# Patient Record
Sex: MALE | Race: WHITE | Employment: FULL TIME | URBAN - METROPOLITAN AREA
[De-identification: names, ages, dates, MRNs, and addresses within clinical notes are randomized per-mention and may not be internally consistent; named-entity substitution may affect disease eponyms.]

---

## 2017-05-05 ENCOUNTER — MED REC SCAN ONLY (OUTPATIENT)
Dept: FAMILY MEDICINE CLINIC | Facility: CLINIC | Age: 52
End: 2017-05-05

## 2017-05-05 ENCOUNTER — OFFICE VISIT (OUTPATIENT)
Dept: FAMILY MEDICINE CLINIC | Facility: CLINIC | Age: 52
End: 2017-05-05

## 2017-05-05 VITALS
TEMPERATURE: 98 F | SYSTOLIC BLOOD PRESSURE: 108 MMHG | DIASTOLIC BLOOD PRESSURE: 66 MMHG | BODY MASS INDEX: 28.95 KG/M2 | HEIGHT: 68 IN | WEIGHT: 191 LBS | OXYGEN SATURATION: 98 % | HEART RATE: 90 BPM

## 2017-05-05 DIAGNOSIS — J02.9 PHARYNGITIS, UNSPECIFIED ETIOLOGY: Primary | ICD-10-CM

## 2017-05-05 PROCEDURE — 99213 OFFICE O/P EST LOW 20 MIN: CPT | Performed by: FAMILY MEDICINE

## 2017-05-05 NOTE — PROGRESS NOTES
HPI:  Patient presents with:  Sore Throat: for 2 days      Leah Viera is a 54year old male who presents with upper respiratory symptoms for 2-3 days. Patient reports no cough. Mild post nasal drip. Mild-mod nasal congestion. No fevers or chills. problems, no oral lesions  HEART:  No chest pain or palpitations  LUNG:  no SOB, mild cough, no wheeze  GI:  No abdominal pain.   No N/V/D/C  :  No dysuria  MS:  No joint pain or swelling B  NEURO:  Denies numbness or tingling or weakness  PSYCH:  No mood

## 2017-06-02 ENCOUNTER — PATIENT MESSAGE (OUTPATIENT)
Dept: FAMILY MEDICINE CLINIC | Facility: CLINIC | Age: 52
End: 2017-06-02

## 2017-06-02 DIAGNOSIS — E78.2 MIXED HYPERLIPIDEMIA: Primary | ICD-10-CM

## 2017-06-02 RX ORDER — TAMSULOSIN HYDROCHLORIDE 0.4 MG/1
0.4 CAPSULE ORAL DAILY
Qty: 90 CAPSULE | Refills: 0 | Status: SHIPPED | OUTPATIENT
Start: 2017-06-02 | End: 2017-08-21

## 2017-06-02 RX ORDER — LISINOPRIL 2.5 MG/1
2.5 TABLET ORAL DAILY
Qty: 90 TABLET | Refills: 0 | Status: SHIPPED | OUTPATIENT
Start: 2017-06-02 | End: 2017-10-02

## 2017-06-02 RX ORDER — NIACIN 500 MG/1
500 TABLET, EXTENDED RELEASE ORAL NIGHTLY
Qty: 90 TABLET | Refills: 0 | Status: SHIPPED | OUTPATIENT
Start: 2017-06-02 | End: 2017-07-24

## 2017-06-02 NOTE — TELEPHONE ENCOUNTER
From: Joshua Reyes  To: Michel Bell MD  Sent: 6/2/2017 2:23 PM CDT  Subject: Other    I need Refills for the following:    Zoloft 50  Lisinopril 2.5mg  Flomax . 4 Mg  Niaspan 500 mg    all called into [facility]    I tried to have Jason Waddell

## 2017-06-21 ENCOUNTER — HOSPITAL ENCOUNTER (OUTPATIENT)
Dept: GENERAL RADIOLOGY | Age: 52
Discharge: HOME | End: 2017-06-21
Attending: FAMILY MEDICINE
Payer: COMMERCIAL

## 2017-06-21 ENCOUNTER — OFFICE VISIT (OUTPATIENT)
Dept: FAMILY MEDICINE CLINIC | Facility: CLINIC | Age: 52
End: 2017-06-21

## 2017-06-21 ENCOUNTER — APPOINTMENT (OUTPATIENT)
Dept: LAB | Age: 52
End: 2017-06-21
Attending: FAMILY MEDICINE
Payer: COMMERCIAL

## 2017-06-21 VITALS
SYSTOLIC BLOOD PRESSURE: 116 MMHG | HEIGHT: 68 IN | TEMPERATURE: 98 F | OXYGEN SATURATION: 97 % | BODY MASS INDEX: 29.25 KG/M2 | WEIGHT: 193 LBS | DIASTOLIC BLOOD PRESSURE: 68 MMHG | HEART RATE: 81 BPM

## 2017-06-21 DIAGNOSIS — M25.551 RIGHT HIP PAIN: ICD-10-CM

## 2017-06-21 DIAGNOSIS — E78.2 MIXED HYPERLIPIDEMIA: ICD-10-CM

## 2017-06-21 DIAGNOSIS — I10 ESSENTIAL HYPERTENSION: ICD-10-CM

## 2017-06-21 DIAGNOSIS — N40.0 BENIGN NON-NODULAR PROSTATIC HYPERPLASIA, PRESENCE OF LOWER URINARY TRACT SYMPTOMS UNSPECIFIED: ICD-10-CM

## 2017-06-21 DIAGNOSIS — Z00.00 ROUTINE MEDICAL EXAM: Primary | ICD-10-CM

## 2017-06-21 DIAGNOSIS — Z00.00 ROUTINE MEDICAL EXAM: ICD-10-CM

## 2017-06-21 DIAGNOSIS — R17 ELEVATED BILIRUBIN: ICD-10-CM

## 2017-06-21 DIAGNOSIS — B35.1 ONYCHOMYCOSIS: ICD-10-CM

## 2017-06-21 DIAGNOSIS — F41.9 ANXIETY: ICD-10-CM

## 2017-06-21 DIAGNOSIS — N52.8 OTHER MALE ERECTILE DYSFUNCTION: ICD-10-CM

## 2017-06-21 PROCEDURE — 80061 LIPID PANEL: CPT

## 2017-06-21 PROCEDURE — 36415 COLL VENOUS BLD VENIPUNCTURE: CPT

## 2017-06-21 PROCEDURE — 99396 PREV VISIT EST AGE 40-64: CPT | Performed by: FAMILY MEDICINE

## 2017-06-21 PROCEDURE — 99213 OFFICE O/P EST LOW 20 MIN: CPT | Performed by: FAMILY MEDICINE

## 2017-06-21 PROCEDURE — 80053 COMPREHEN METABOLIC PANEL: CPT

## 2017-06-21 PROCEDURE — 73502 X-RAY EXAM HIP UNI 2-3 VIEWS: CPT | Performed by: FAMILY MEDICINE

## 2017-06-21 PROCEDURE — 82274 ASSAY TEST FOR BLOOD FECAL: CPT | Performed by: FAMILY MEDICINE

## 2017-06-22 PROBLEM — N40.0 BENIGN NON-NODULAR PROSTATIC HYPERPLASIA: Status: ACTIVE | Noted: 2017-06-22

## 2017-06-22 PROBLEM — I10 ESSENTIAL HYPERTENSION: Status: ACTIVE | Noted: 2017-06-22

## 2017-06-22 PROBLEM — F41.9 ANXIETY: Status: ACTIVE | Noted: 2017-06-22

## 2017-06-23 NOTE — H&P
HPI:   Patient presents with:   Annual: yearly px  Hip Pain  Anxiety  HTN  Hyperlipidemia      Mitali Gerardo is a 54year old male who presents for a complete physical exam.     Patient has new complaints of:  · Right hip pain worsening over the last Medical History, Past Surgical History, Family and Social History    Labs:     Lab Results  Component Value Date/Time   WBC 4.4 10/15/2010 09:25 AM    10/15/2010 09:25 AM        Lab Results  Component Value Date/Time   GLU 89 06/21/2017 04:35 PM   N Comment: Procedure: LUMBAR EPIDURAL;  Surgeon:                Mary Hall MD;  Location: [facility]                PAIN [facility]  No date: OTHER SURGICAL HISTORY      Comment: SINUS SURG - 8YR AGO  No date: VASECTOMY   Family History   Problem Re normocephalic, nose and throat are clear; dentition good, EARS: canals clear, TM wnl B  EYES:PERRLA, EOMI, conjunctiva are clear, no discharge; no nystagmus  NECK: supple, no LAD, no TM, no carotid bruits or JVD B  LUNGS: good BS B, clear to auscultation B (Screening) [E]; Future  - Occult Blood, Fecal, Immunoassay; Future  - Occult Blood, Fecal, Immunoassay    2. Essential hypertension, stable  We will continue present management, lisinopril 2.5 mg once a day, follow-up 6 months    3.  Mixed hyperlipidemia

## 2017-06-26 PROBLEM — R17 ELEVATED BILIRUBIN: Status: ACTIVE | Noted: 2017-06-26

## 2017-06-26 PROBLEM — B35.1 ONYCHOMYCOSIS: Status: ACTIVE | Noted: 2017-06-26

## 2017-07-04 ENCOUNTER — PATIENT MESSAGE (OUTPATIENT)
Dept: FAMILY MEDICINE CLINIC | Facility: CLINIC | Age: 52
End: 2017-07-04

## 2017-07-10 NOTE — TELEPHONE ENCOUNTER
From: Cortez Vigil  To: Summer Chu MD  Sent: 7/4/2017 4:01 PM CDT  Subject: Other    BP Results after I stop the BP medicine:    128/73  126/78  123/77     I will continue to track to ensure it stays below 135    Thanks Surekha Miller

## 2017-07-24 DIAGNOSIS — E78.2 MIXED HYPERLIPIDEMIA: ICD-10-CM

## 2017-07-24 NOTE — TELEPHONE ENCOUNTER
From: Leah Viera  Sent: 7/24/2017 8:46 AM CDT  Subject: Medication Renewal Request    Savi Casarez.  Kenneth Crook would like a refill of the following medications:  Niacin ER, Antihyperlipidemic, (NIASPAN) 500 MG Oral Tab CR Wade Valadez MD]    Pr

## 2017-07-25 RX ORDER — NIACIN 500 MG/1
500 TABLET, EXTENDED RELEASE ORAL NIGHTLY
Qty: 90 TABLET | Refills: 0 | Status: SHIPPED
Start: 2017-07-25 | End: 2017-10-13

## 2017-08-21 DIAGNOSIS — E78.2 MIXED HYPERLIPIDEMIA: ICD-10-CM

## 2017-08-22 RX ORDER — TAMSULOSIN HYDROCHLORIDE 0.4 MG/1
CAPSULE ORAL
Qty: 90 CAPSULE | Refills: 0 | Status: SHIPPED | OUTPATIENT
Start: 2017-08-22 | End: 2017-11-10

## 2017-10-02 ENCOUNTER — PATIENT MESSAGE (OUTPATIENT)
Dept: FAMILY MEDICINE CLINIC | Facility: CLINIC | Age: 52
End: 2017-10-02

## 2017-10-02 DIAGNOSIS — E78.2 MIXED HYPERLIPIDEMIA: ICD-10-CM

## 2017-10-03 RX ORDER — LISINOPRIL 2.5 MG/1
TABLET ORAL
Qty: 90 TABLET | Refills: 0 | Status: SHIPPED | OUTPATIENT
Start: 2017-10-03 | End: 2017-10-13 | Stop reason: ALTCHOICE

## 2017-10-13 ENCOUNTER — OFFICE VISIT (OUTPATIENT)
Dept: FAMILY MEDICINE CLINIC | Facility: CLINIC | Age: 52
End: 2017-10-13

## 2017-10-13 VITALS
SYSTOLIC BLOOD PRESSURE: 120 MMHG | HEART RATE: 79 BPM | DIASTOLIC BLOOD PRESSURE: 84 MMHG | OXYGEN SATURATION: 97 % | BODY MASS INDEX: 29.1 KG/M2 | HEIGHT: 68 IN | WEIGHT: 192 LBS | TEMPERATURE: 99 F

## 2017-10-13 DIAGNOSIS — Z23 NEED FOR VACCINATION: ICD-10-CM

## 2017-10-13 DIAGNOSIS — I10 ESSENTIAL HYPERTENSION: ICD-10-CM

## 2017-10-13 DIAGNOSIS — E78.2 MIXED HYPERLIPIDEMIA: Primary | ICD-10-CM

## 2017-10-13 DIAGNOSIS — F32.A DEPRESSION, UNSPECIFIED DEPRESSION TYPE: ICD-10-CM

## 2017-10-13 DIAGNOSIS — F41.9 ANXIETY: ICD-10-CM

## 2017-10-13 PROCEDURE — 99214 OFFICE O/P EST MOD 30 MIN: CPT | Performed by: FAMILY MEDICINE

## 2017-10-13 PROCEDURE — 90715 TDAP VACCINE 7 YRS/> IM: CPT | Performed by: FAMILY MEDICINE

## 2017-10-13 PROCEDURE — 90472 IMMUNIZATION ADMIN EACH ADD: CPT | Performed by: FAMILY MEDICINE

## 2017-10-13 PROCEDURE — 90686 IIV4 VACC NO PRSV 0.5 ML IM: CPT | Performed by: FAMILY MEDICINE

## 2017-10-13 PROCEDURE — 90471 IMMUNIZATION ADMIN: CPT | Performed by: FAMILY MEDICINE

## 2017-10-13 RX ORDER — ATORVASTATIN CALCIUM 10 MG/1
10 TABLET, FILM COATED ORAL
Qty: 90 TABLET | Refills: 0 | Status: SHIPPED | OUTPATIENT
Start: 2017-10-13 | End: 2017-12-27

## 2017-10-13 RX ORDER — NIACIN 500 MG/1
TABLET, EXTENDED RELEASE ORAL
Qty: 180 TABLET | Refills: 0 | Status: SHIPPED | OUTPATIENT
Start: 2017-10-13 | End: 2017-12-27

## 2017-10-13 NOTE — PROGRESS NOTES
HPI:   Patient presents with:  Hyperlipidemia  Depression  Hypertension: not on Rx now-resolved      Valiant Silvano is a 50year old male who presents for recheck of his hyperlipidemia.     Current medication: Lipitor 10 mg once a day, Niaspan 06/21/2017 04:35 PM   AST 21 06/21/2017 04:35 PM   ALT 27 06/21/2017 04:35 PM   BILT 2.0 (H) 06/21/2017 04:35 PM   TSH 1.736 06/29/2007 01:37 PM           Medications:    Current Outpatient Prescriptions on File Prior to Visit:  SERTRALINE HCL 50 MG Oral T lesions  HEART:  No chest pain or palpitations  LUNG:  No SOB, cough or wheeze  GI:  No abdominal pain.   No N/V/D/C  :  No dysuria  MS:  No joint pain or swelling B  SKIN: No skin lesions  NEURO:  No numbness, tingling or weakness  PSYCH:  No mood concer return in 8 month(s). Additional Assessment and Plan:  1. Mixed hyperlipidemia  See above  - Niacin ER, Antihyperlipidemic, (NIASPAN) 500 MG Oral Tab CR; 2 tabs po qam  Dispense: 180 tablet; Refill: 0  - atorvastatin (LIPITOR) 10 MG Oral Tab;  Take 1 tab

## 2017-11-10 DIAGNOSIS — E78.2 MIXED HYPERLIPIDEMIA: ICD-10-CM

## 2017-11-10 RX ORDER — TAMSULOSIN HYDROCHLORIDE 0.4 MG/1
CAPSULE ORAL
Qty: 90 CAPSULE | Refills: 0 | Status: SHIPPED | OUTPATIENT
Start: 2017-11-10 | End: 2017-12-27

## 2017-11-10 NOTE — TELEPHONE ENCOUNTER
[address] [facility] [employer] requests refill for:    Tamsulosin HCL 0.4 mg oral capsule  Sig: Take 1 capsule PO QD  Qty: 90    LOV 10/13/17 unrelated issue  LOV 6/21/17 BPH  F/p due 6mo-1yr  LRF 8/22/17

## 2017-11-21 ENCOUNTER — HOSPITAL ENCOUNTER (OUTPATIENT)
Dept: CT IMAGING | Age: 52
Discharge: HOME | End: 2017-11-21
Attending: FAMILY MEDICINE

## 2017-11-21 DIAGNOSIS — Z13.9 ENCOUNTER FOR SCREENING: ICD-10-CM

## 2017-12-27 DIAGNOSIS — E78.2 MIXED HYPERLIPIDEMIA: ICD-10-CM

## 2017-12-27 RX ORDER — NIACIN 500 MG/1
TABLET, EXTENDED RELEASE ORAL
Qty: 180 TABLET | Refills: 0 | Status: SHIPPED | OUTPATIENT
Start: 2017-12-27 | End: 2018-04-29

## 2017-12-27 RX ORDER — ATORVASTATIN CALCIUM 10 MG/1
TABLET, FILM COATED ORAL
Qty: 90 TABLET | Refills: 0 | Status: SHIPPED | OUTPATIENT
Start: 2017-12-27 | End: 2018-07-12

## 2017-12-28 RX ORDER — TAMSULOSIN HYDROCHLORIDE 0.4 MG/1
CAPSULE ORAL
Qty: 90 CAPSULE | Refills: 0 | Status: SHIPPED | OUTPATIENT
Start: 2017-12-28 | End: 2018-03-23

## 2017-12-28 NOTE — TELEPHONE ENCOUNTER
[address] requests refill for:    Tamsulosin HCL 0.4 mg oral capsule  Sig: Take 1 capsule QD  Qty: 90    LOV 10/13/17 Non-related issue; 6/21/17 BPH  F/u due 6 mo to 1 year p LOV  Last PSA 6/21/17  Normal 0.6 ng/ml

## 2017-12-31 DIAGNOSIS — E78.2 MIXED HYPERLIPIDEMIA: ICD-10-CM

## 2018-01-03 RX ORDER — LISINOPRIL 2.5 MG/1
TABLET ORAL
Qty: 90 TABLET | Refills: 0 | Status: SHIPPED | OUTPATIENT
Start: 2018-01-03 | End: 2018-06-29 | Stop reason: ALTCHOICE

## 2018-03-23 DIAGNOSIS — E78.2 MIXED HYPERLIPIDEMIA: ICD-10-CM

## 2018-03-23 RX ORDER — TAMSULOSIN HYDROCHLORIDE 0.4 MG/1
CAPSULE ORAL
Qty: 90 CAPSULE | Refills: 0 | Status: SHIPPED | OUTPATIENT
Start: 2018-03-23 | End: 2018-04-30

## 2018-04-29 DIAGNOSIS — E78.2 MIXED HYPERLIPIDEMIA: ICD-10-CM

## 2018-04-30 RX ORDER — TAMSULOSIN HYDROCHLORIDE 0.4 MG/1
CAPSULE ORAL
Qty: 90 CAPSULE | Refills: 0 | Status: SHIPPED | OUTPATIENT
Start: 2018-04-30 | End: 2018-07-26

## 2018-04-30 RX ORDER — NIACIN 500 MG/1
TABLET, EXTENDED RELEASE ORAL
Qty: 180 TABLET | Refills: 0 | Status: SHIPPED | OUTPATIENT
Start: 2018-04-30 | End: 2018-07-26

## 2018-06-10 DIAGNOSIS — E78.2 MIXED HYPERLIPIDEMIA: ICD-10-CM

## 2018-06-11 NOTE — TELEPHONE ENCOUNTER
A refill request was received for:    Pending Prescriptions Disp Refills    SERTRALINE HCL 50 MG Oral Tab [Pharmacy Med Name: SERTRALINE TAB 50MG] 90 tablet 0      Sig: TAKE 1 TABLET DAILY         Last refill date: 3/23/18  Qty:90  Last office visit: 10/13

## 2018-06-28 ENCOUNTER — TELEPHONE (OUTPATIENT)
Dept: FAMILY MEDICINE CLINIC | Facility: CLINIC | Age: 53
End: 2018-06-28

## 2018-06-28 DIAGNOSIS — N40.0 BENIGN NON-NODULAR PROSTATIC HYPERPLASIA: ICD-10-CM

## 2018-06-28 DIAGNOSIS — E78.2 MIXED HYPERLIPIDEMIA: Primary | ICD-10-CM

## 2018-06-29 ENCOUNTER — OFFICE VISIT (OUTPATIENT)
Dept: FAMILY MEDICINE CLINIC | Facility: CLINIC | Age: 53
End: 2018-06-29

## 2018-06-29 VITALS
SYSTOLIC BLOOD PRESSURE: 124 MMHG | WEIGHT: 190 LBS | OXYGEN SATURATION: 99 % | RESPIRATION RATE: 17 BRPM | HEART RATE: 87 BPM | BODY MASS INDEX: 28.79 KG/M2 | DIASTOLIC BLOOD PRESSURE: 82 MMHG | HEIGHT: 68 IN

## 2018-06-29 DIAGNOSIS — Z86.79 HISTORY OF HYPERTENSION: ICD-10-CM

## 2018-06-29 DIAGNOSIS — Z12.11 SCREENING FOR COLON CANCER: ICD-10-CM

## 2018-06-29 DIAGNOSIS — B35.1 ONYCHOMYCOSIS: ICD-10-CM

## 2018-06-29 DIAGNOSIS — F41.1 GENERALIZED ANXIETY DISORDER: ICD-10-CM

## 2018-06-29 DIAGNOSIS — N40.0 BENIGN NON-NODULAR PROSTATIC HYPERPLASIA: ICD-10-CM

## 2018-06-29 DIAGNOSIS — E78.2 MIXED HYPERLIPIDEMIA: Primary | ICD-10-CM

## 2018-06-29 PROCEDURE — 99214 OFFICE O/P EST MOD 30 MIN: CPT | Performed by: FAMILY MEDICINE

## 2018-06-30 NOTE — PROGRESS NOTES
HPI:   Patient presents with:  Hyperlipidemia  Anxiety  Depression  Prostate Problem      PatientMicpetrosrobyn Michelle is a 50year old male who presents for recheck of his hyperlipidemia.     Current medication:  See below: Atorvastatin 10 mg once a day, ni PM   BILT 2.0 (H) 06/21/2017 04:35 PM   TSH 1.736 06/29/2007 01:37 PM           Medications:    Current Outpatient Prescriptions on File Prior to Visit:  SERTRALINE HCL 50 MG Oral Tab TAKE 1 TABLET DAILY Disp: 90 tablet Rfl: 0   NIACIN ER, ANTIHYPERLIPIDEM problems, no oral lesions  HEART:  No chest pain or palpitations  LUNG:  No SOB, cough or wheeze  GI:  No abdominal pain.   No N/V/D/C  :  No dysuria  MS: Chronic right hip pain, intermittent  SKIN: No skin lesions  NEURO:  No numbness, tingling or weakne anxiety disorder  Appears stable, mood good, no SHIP, will CPM, f/u 6 mo     3. Benign non-nodular prostatic hyperplasia  Stable, no new complaints, continue present management, follow-up 6-12 months    4.  History of hypertension  Now resolved off medicati

## 2018-07-10 LAB
ABSOLUTE BASOPHILS: 30 CELLS/UL (ref 0–200)
ABSOLUTE EOSINOPHILS: 60 CELLS/UL (ref 15–500)
ABSOLUTE LYMPHOCYTES: 2012 CELLS/UL (ref 850–3900)
ABSOLUTE MONOCYTES: 391 CELLS/UL (ref 200–950)
ABSOLUTE NEUTROPHILS: 1806 CELLS/UL (ref 1500–7800)
ALBUMIN/GLOBULIN RATIO: 1.9 (CALC) (ref 1–2.5)
ALBUMIN: 4.4 G/DL (ref 3.6–5.1)
ALKALINE PHOSPHATASE: 84 U/L (ref 40–115)
ALT: 20 U/L (ref 9–46)
AST: 19 U/L (ref 10–35)
BASOPHILS: 0.7 %
BILIRUBIN, TOTAL: 2 MG/DL (ref 0.2–1.2)
BUN: 15 MG/DL (ref 7–25)
CALCIUM: 9.6 MG/DL (ref 8.6–10.3)
CARBON DIOXIDE: 26 MMOL/L (ref 20–31)
CHLORIDE: 105 MMOL/L (ref 98–110)
CHOL/HDLC RATIO: 4.3 (CALC)
CHOLESTEROL, TOTAL: 184 MG/DL
CREATININE: 1.07 MG/DL (ref 0.7–1.33)
EGFR IF AFRICN AM: 93 ML/MIN/1.73M2
EGFR IF NONAFRICN AM: 81 ML/MIN/1.73M2
EOSINOPHILS: 1.4 %
GLOBULIN: 2.3 G/DL (CALC) (ref 1.9–3.7)
GLUCOSE: 99 MG/DL (ref 65–99)
HDL CHOLESTEROL: 43 MG/DL
HEMATOCRIT: 45.9 % (ref 38.5–50)
HEMOGLOBIN: 15.2 G/DL (ref 13.2–17.1)
LDL-CHOLESTEROL: 104 MG/DL (CALC)
LYMPHOCYTES: 46.8 %
MCH: 28.8 PG (ref 27–33)
MCHC: 33.1 G/DL (ref 32–36)
MCV: 87.1 FL (ref 80–100)
MONOCYTES: 9.1 %
MPV: 10.2 FL (ref 7.5–12.5)
NEUTROPHILS: 42 %
NON-HDL CHOLESTEROL: 141 MG/DL (CALC)
PLATELET COUNT: 236 THOUSAND/UL (ref 140–400)
POTASSIUM: 4.1 MMOL/L (ref 3.5–5.3)
PROTEIN, TOTAL: 6.7 G/DL (ref 6.1–8.1)
PSA, TOTAL: 0.4 NG/ML
RDW: 12.5 % (ref 11–15)
RED BLOOD CELL COUNT: 5.27 MILLION/UL (ref 4.2–5.8)
SODIUM: 140 MMOL/L (ref 135–146)
TRIGLYCERIDES: 260 MG/DL
WHITE BLOOD CELL COUNT: 4.3 THOUSAND/UL (ref 3.8–10.8)

## 2018-07-26 DIAGNOSIS — E78.2 MIXED HYPERLIPIDEMIA: ICD-10-CM

## 2018-07-26 RX ORDER — NIACIN 500 MG/1
TABLET, EXTENDED RELEASE ORAL
Qty: 180 TABLET | Refills: 0 | Status: SHIPPED | OUTPATIENT
Start: 2018-07-26 | End: 2018-10-13

## 2018-07-26 RX ORDER — TAMSULOSIN HYDROCHLORIDE 0.4 MG/1
CAPSULE ORAL
Qty: 90 CAPSULE | Refills: 0 | Status: SHIPPED | OUTPATIENT
Start: 2018-07-26 | End: 2019-02-22

## 2018-07-26 NOTE — TELEPHONE ENCOUNTER
Rx Niacin passes protocol - rx refilled.   CH; ALT < 80, ALT in past y, lipid in past y, apt 6/29/18

## 2018-07-26 NOTE — TELEPHONE ENCOUNTER
A refill request was received for:    Pending Prescriptions Disp Refills    SERTRALINE HCL 50 MG Oral Tab [Pharmacy Med Name: SERTRALINE TAB 50MG] 90 tablet 0      Sig: TAKE 1 TABLET DAILY      TAMSULOSIN HCL 0.4 MG Oral Cap [Pharmacy Med Name: TAMSULOSIN

## 2018-09-28 ENCOUNTER — MED REC SCAN ONLY (OUTPATIENT)
Dept: FAMILY MEDICINE CLINIC | Facility: CLINIC | Age: 53
End: 2018-09-28

## 2018-10-13 DIAGNOSIS — E78.2 MIXED HYPERLIPIDEMIA: ICD-10-CM

## 2018-10-14 RX ORDER — NIACIN 500 MG/1
TABLET, EXTENDED RELEASE ORAL
Qty: 180 TABLET | Refills: 0 | Status: SHIPPED | OUTPATIENT
Start: 2018-10-14 | End: 2018-11-23

## 2018-11-23 DIAGNOSIS — E78.2 MIXED HYPERLIPIDEMIA: ICD-10-CM

## 2018-11-25 RX ORDER — NIACIN 500 MG/1
TABLET, EXTENDED RELEASE ORAL
Qty: 180 TABLET | Refills: 0 | Status: SHIPPED | OUTPATIENT
Start: 2018-11-25 | End: 2019-02-22

## 2018-11-26 NOTE — TELEPHONE ENCOUNTER
A refill request was received for:  Requested Prescriptions     Pending Prescriptions Disp Refills   • SERTRALINE HCL 50 MG Oral Tab [Pharmacy Med Name: SERTRALINE TAB 50MG] 90 tablet 0     Sig: TAKE 1 TABLET DAILY   • TAMSULOSIN HCL 0.4 MG Oral Cap [Pharm

## 2018-11-27 RX ORDER — TAMSULOSIN HYDROCHLORIDE 0.4 MG/1
CAPSULE ORAL
Qty: 90 CAPSULE | Refills: 0 | Status: SHIPPED | OUTPATIENT
Start: 2018-11-27 | End: 2019-02-22

## 2018-12-17 PROCEDURE — 88305 TISSUE EXAM BY PATHOLOGIST: CPT | Performed by: INTERNAL MEDICINE

## 2019-02-22 ENCOUNTER — OFFICE VISIT (OUTPATIENT)
Dept: FAMILY MEDICINE CLINIC | Facility: CLINIC | Age: 54
End: 2019-02-22
Payer: COMMERCIAL

## 2019-02-22 VITALS
DIASTOLIC BLOOD PRESSURE: 78 MMHG | OXYGEN SATURATION: 98 % | SYSTOLIC BLOOD PRESSURE: 124 MMHG | TEMPERATURE: 98 F | HEART RATE: 72 BPM | WEIGHT: 185 LBS | RESPIRATION RATE: 18 BRPM | HEIGHT: 67 IN | BODY MASS INDEX: 29.03 KG/M2

## 2019-02-22 DIAGNOSIS — R17 ELEVATED BILIRUBIN: ICD-10-CM

## 2019-02-22 DIAGNOSIS — Z86.79 HISTORY OF HYPERTENSION: ICD-10-CM

## 2019-02-22 DIAGNOSIS — Z00.00 ROUTINE MEDICAL EXAM: Primary | ICD-10-CM

## 2019-02-22 DIAGNOSIS — F41.1 GENERALIZED ANXIETY DISORDER: ICD-10-CM

## 2019-02-22 DIAGNOSIS — E78.2 MIXED HYPERLIPIDEMIA: ICD-10-CM

## 2019-02-22 DIAGNOSIS — N40.0 BENIGN NON-NODULAR PROSTATIC HYPERPLASIA: ICD-10-CM

## 2019-02-22 PROCEDURE — 99213 OFFICE O/P EST LOW 20 MIN: CPT | Performed by: FAMILY MEDICINE

## 2019-02-22 PROCEDURE — 99396 PREV VISIT EST AGE 40-64: CPT | Performed by: FAMILY MEDICINE

## 2019-02-22 RX ORDER — TAMSULOSIN HYDROCHLORIDE 0.4 MG/1
0.4 CAPSULE ORAL
Qty: 90 CAPSULE | Refills: 0 | Status: SHIPPED | OUTPATIENT
Start: 2019-02-22 | End: 2019-06-05

## 2019-02-22 RX ORDER — NIACIN 500 MG/1
TABLET, EXTENDED RELEASE ORAL
Qty: 180 TABLET | Refills: 0 | Status: SHIPPED | OUTPATIENT
Start: 2019-02-22

## 2019-02-22 RX ORDER — ATORVASTATIN CALCIUM 10 MG/1
TABLET, FILM COATED ORAL
Qty: 90 TABLET | Refills: 0 | Status: SHIPPED | OUTPATIENT
Start: 2019-02-22 | End: 2019-10-26

## 2019-02-24 NOTE — H&P
HPI:   Patient presents with:  Physical  Hyperlipidemia  Anxiety      Tracy Palacio is a 48year old male who presents for a complete physical exam.     Patient has new complaints of:  Hyperlipidemia recheck.   Current medication:  See below:  Lele (H) 07/09/2018 07:34 AM    NONHDLC 141 (H) 07/09/2018 07:34 AM       No results found for: A1C   No results found for: VITD          Current Outpatient Medications:  Niacin ER, Antihyperlipidemic, 500 MG Oral Tab CR TAKE 2 TABLETS EVERY       MORNING Disp: Alcohol use: Yes        Frequency: 2-4 times a month        Drinks per session: 1 or 2        Binge frequency: Never        Comment: social      Drug use: No    Other Topics      Concerns:        REVIEW OF SYSTEMS:   Pertinent positives and negatives noted is a 48year old male who presents for a complete physical exam.    Patient is in good health. His weight is stable, Estimated body mass index is 28.98 kg/m² as calculated from the following:    Height as of this encounter: 67\".     Weight as of this enco encounter.       Meds & Refills for this Visit:  Requested Prescriptions     Signed Prescriptions Disp Refills   • Niacin ER, Antihyperlipidemic, 500 MG Oral Tab  tablet 0     Sig: TAKE 2 TABLETS EVERY       MORNING   • Sertraline HCl 50 MG Oral Tab 9

## 2019-06-05 DIAGNOSIS — E78.2 MIXED HYPERLIPIDEMIA: ICD-10-CM

## 2019-06-05 RX ORDER — TAMSULOSIN HYDROCHLORIDE 0.4 MG/1
CAPSULE ORAL
Qty: 90 CAPSULE | Refills: 0 | Status: SHIPPED | OUTPATIENT
Start: 2019-06-05 | End: 2019-08-23

## 2019-07-13 DIAGNOSIS — E78.2 MIXED HYPERLIPIDEMIA: ICD-10-CM

## 2019-07-13 RX ORDER — NIACIN 500 MG/1
TABLET, EXTENDED RELEASE ORAL
Qty: 180 TABLET | Refills: 0 | Status: SHIPPED | OUTPATIENT
Start: 2019-07-13 | End: 2019-10-08

## 2019-07-13 NOTE — TELEPHONE ENCOUNTER
Rx passes protocol - rx refilled. Lipid; ALT < 80 ALT and lipid done in past 12 m, apt in past 12 m     Return in about 6 months (around 8/22/2019) for HYPERLIPIDEMIA, ANXIETY.

## 2019-07-13 NOTE — TELEPHONE ENCOUNTER
Please call pt - due for follow up cholesterol next month as Dr. Genesis Kwong is booking out until then

## 2019-08-23 ENCOUNTER — TELEPHONE (OUTPATIENT)
Dept: FAMILY MEDICINE CLINIC | Facility: CLINIC | Age: 54
End: 2019-08-23

## 2019-08-23 DIAGNOSIS — E78.2 MIXED HYPERLIPIDEMIA: ICD-10-CM

## 2019-08-23 RX ORDER — TAMSULOSIN HYDROCHLORIDE 0.4 MG/1
0.4 CAPSULE ORAL
Qty: 90 CAPSULE | Refills: 0 | Status: SHIPPED | OUTPATIENT
Start: 2019-08-23 | End: 2019-11-11

## 2019-08-23 NOTE — TELEPHONE ENCOUNTER
A refill request was received for:  Requested Prescriptions     Pending Prescriptions Disp Refills   • tamsulosin HCl 0.4 MG Oral Cap 90 capsule 0     Sig: Take 1 capsule (0.4 mg total) by mouth once daily.      Last refill date: 06/05/2019  Qty:90  Last of

## 2019-08-23 NOTE — TELEPHONE ENCOUNTER
Can you please make an appointment for this patient in order to get his medication.   Thank you   Brittanie Maurice

## 2019-08-23 NOTE — TELEPHONE ENCOUNTER
A refill request was received for:  Requested Prescriptions     Pending Prescriptions Disp Refills   • tamsulosin HCl 0.4 MG Oral Cap 90 capsule 0     Sig: Take 1 capsule (0.4 mg total) by mouth once daily.    • Sertraline HCl 50 MG Oral Tab 90 tablet 0

## 2019-10-08 DIAGNOSIS — E78.2 MIXED HYPERLIPIDEMIA: ICD-10-CM

## 2019-10-08 NOTE — TELEPHONE ENCOUNTER
Please call patient, has apt scheduled for 12/27/2019, needs sooner apt. Will send in refill once done. For cpe LOV 2/22/19 - Return in about 6 months (around 8/22/2019) for [address] [place] [address], [facility].

## 2019-10-09 NOTE — TELEPHONE ENCOUNTER
Cld PT to see if he can come in earlies than Dec. He cannot because he is living in [place] so 12/27 is the soonest he can get in.

## 2019-10-18 RX ORDER — NIACIN 500 MG/1
TABLET, EXTENDED RELEASE ORAL
Qty: 120 TABLET | Refills: 0 | Status: SHIPPED | OUTPATIENT
Start: 2019-10-18 | End: 2019-12-06

## 2019-10-18 NOTE — TELEPHONE ENCOUNTER
Rx passes protocol - rx refilled.   Niacin / hyperlipid; ALT < 80, ALT and lipid in past 12 m, apt in past 12m

## 2019-10-26 DIAGNOSIS — E78.2 MIXED HYPERLIPIDEMIA: ICD-10-CM

## 2019-10-26 RX ORDER — ATORVASTATIN CALCIUM 10 MG/1
TABLET, FILM COATED ORAL
Qty: 90 TABLET | Refills: 0 | Status: SHIPPED | OUTPATIENT
Start: 2019-10-26

## 2019-10-26 NOTE — TELEPHONE ENCOUNTER
A refill request was received for:  Requested Prescriptions     Pending Prescriptions Disp Refills   • atorvastatin 10 MG Oral Tab 90 tablet 0     Sig: TAKE 1 TABLET BY MOUTH ONE TIME A DAY     Last refill date:  2/22/2019  Qty:   90  Last office visit:  2

## 2019-11-11 DIAGNOSIS — E78.2 MIXED HYPERLIPIDEMIA: ICD-10-CM

## 2019-11-11 RX ORDER — TAMSULOSIN HYDROCHLORIDE 0.4 MG/1
CAPSULE ORAL
Qty: 90 CAPSULE | Refills: 0 | Status: SHIPPED | OUTPATIENT
Start: 2019-11-11

## 2019-12-06 DIAGNOSIS — E78.2 MIXED HYPERLIPIDEMIA: ICD-10-CM

## 2019-12-06 RX ORDER — NIACIN 500 MG/1
500 TABLET, EXTENDED RELEASE ORAL 2 TIMES DAILY
Qty: 60 TABLET | Refills: 0 | Status: SHIPPED | OUTPATIENT
Start: 2019-12-06 | End: 2019-12-26

## 2019-12-06 NOTE — TELEPHONE ENCOUNTER
Rx passes protocol-rx refilled. Lipid; ALT < 80, ALT and lipid in past 12 m, apt in past 12 m    30 d sent.

## 2019-12-22 PROBLEM — F41.1 GENERALIZED ANXIETY DISORDER: Status: ACTIVE | Noted: 2019-12-22

## 2019-12-22 PROBLEM — I10 ESSENTIAL HYPERTENSION: Status: RESOLVED | Noted: 2017-06-22 | Resolved: 2019-12-22

## 2019-12-25 DIAGNOSIS — E78.2 MIXED HYPERLIPIDEMIA: ICD-10-CM

## 2019-12-26 RX ORDER — NIACIN 500 MG/1
TABLET, EXTENDED RELEASE ORAL
Qty: 60 TABLET | Refills: 0 | Status: SHIPPED | OUTPATIENT
Start: 2019-12-26

## 2019-12-26 NOTE — TELEPHONE ENCOUNTER
Rx passes protocol - rx refilled.  30 day sent - due for f/u  Lipid; ALT < 80, ALT and lipid in past 12 m, apt in past 12m

## 2020-01-24 DIAGNOSIS — E78.2 MIXED HYPERLIPIDEMIA: ICD-10-CM

## 2020-01-27 RX ORDER — NIACIN 500 MG/1
TABLET, EXTENDED RELEASE ORAL
Qty: 60 TABLET | Refills: 0 | OUTPATIENT
Start: 2020-01-27

## 2020-01-27 NOTE — TELEPHONE ENCOUNTER
Spoke to pt, he currently lives in [place]. Pt states he will not continue care unless he moves back to [place].

## 2020-01-29 DIAGNOSIS — E78.2 MIXED HYPERLIPIDEMIA: ICD-10-CM

## 2020-01-30 RX ORDER — ATORVASTATIN CALCIUM 10 MG/1
TABLET, FILM COATED ORAL
Qty: 90 TABLET | Refills: 0 | OUTPATIENT
Start: 2020-01-30

## 2020-01-30 NOTE — TELEPHONE ENCOUNTER
Pt has moved to [facility], will not cont care with Dr. Bernice Anthony unless moves back to [place].

## 2024-10-07 ENCOUNTER — IMAGING SERVICES (OUTPATIENT)
Dept: GENERAL RADIOLOGY | Age: 59
End: 2024-10-07
Attending: INTERNAL MEDICINE

## 2024-10-07 ENCOUNTER — TELEPHONE (OUTPATIENT)
Dept: SPORTS MEDICINE | Age: 59
End: 2024-10-07

## 2024-10-07 ENCOUNTER — OFFICE VISIT (OUTPATIENT)
Dept: SPORTS MEDICINE | Age: 59
End: 2024-10-07

## 2024-10-07 VITALS
BODY MASS INDEX: 31.86 KG/M2 | WEIGHT: 203 LBS | HEIGHT: 67 IN | SYSTOLIC BLOOD PRESSURE: 126 MMHG | HEART RATE: 80 BPM | DIASTOLIC BLOOD PRESSURE: 60 MMHG

## 2024-10-07 DIAGNOSIS — M25.532 LEFT WRIST PAIN: ICD-10-CM

## 2024-10-07 DIAGNOSIS — E66.811 CLASS 1 OBESITY WITH BODY MASS INDEX (BMI) OF 31.0 TO 31.9 IN ADULT, UNSPECIFIED OBESITY TYPE, UNSPECIFIED WHETHER SERIOUS COMORBIDITY PRESENT: ICD-10-CM

## 2024-10-07 DIAGNOSIS — S69.92XA LEFT WRIST INJURY, INITIAL ENCOUNTER: ICD-10-CM

## 2024-10-07 DIAGNOSIS — M25.832 ULNAR IMPACTION SYNDROME, LEFT: ICD-10-CM

## 2024-10-07 DIAGNOSIS — S69.82XA INJURY OF TRIANGULAR FIBROCARTILAGE COMPLEX (TFCC) OF LEFT WRIST, INITIAL ENCOUNTER: Primary | ICD-10-CM

## 2024-10-07 PROCEDURE — 73110 X-RAY EXAM OF WRIST: CPT | Performed by: RADIOLOGY

## 2024-10-07 PROCEDURE — 99204 OFFICE O/P NEW MOD 45 MIN: CPT | Performed by: INTERNAL MEDICINE

## 2024-10-07 RX ORDER — ROSUVASTATIN CALCIUM 20 MG/1
TABLET, COATED ORAL
COMMUNITY
Start: 2024-08-20

## 2024-10-07 RX ORDER — ATORVASTATIN CALCIUM 10 MG/1
10 TABLET, FILM COATED ORAL DAILY
COMMUNITY

## 2024-10-07 RX ORDER — ALBUTEROL SULFATE 90 UG/1
INHALANT RESPIRATORY (INHALATION)
COMMUNITY
Start: 2024-06-28

## 2024-10-07 RX ORDER — TAMSULOSIN HYDROCHLORIDE 0.4 MG/1
CAPSULE ORAL
COMMUNITY
Start: 2024-09-21

## 2024-10-07 ASSESSMENT — ENCOUNTER SYMPTOMS
SORE THROAT: 0
NERVOUS/ANXIOUS: 0
SHORTNESS OF BREATH: 0
CHILLS: 0
SLEEP DISTURBANCE: 0
BACK PAIN: 0
EYE PAIN: 0
FEVER: 0
WOUND: 0
DIARRHEA: 0
CONSTIPATION: 0
WHEEZING: 0
ABDOMINAL PAIN: 0
SINUS PAIN: 0
SEIZURES: 0
COUGH: 0

## 2024-10-09 PROBLEM — M25.532 LEFT WRIST PAIN: Status: ACTIVE | Noted: 2024-10-09

## 2024-10-09 ASSESSMENT — ENCOUNTER SYMPTOMS
PAIN LOCATION: LEFT ULNAR WRIST
ALLEVIATING FACTORS: REST
ALLEVIATING FACTORS: AVOIDING MOVEMENT IN INVOLVED AREA
PAIN FREQUENCY: INTERMITTENT
ALLEVIATING FACTORS: ICE

## 2024-10-10 ENCOUNTER — OFFICE VISIT (OUTPATIENT)
Dept: PHYSICAL THERAPY | Age: 59
End: 2024-10-10

## 2024-10-10 DIAGNOSIS — M25.532 LEFT WRIST PAIN: Primary | ICD-10-CM

## 2024-10-10 ASSESSMENT — ENCOUNTER SYMPTOMS
PAIN SCALE AT HIGHEST: 8
QUALITY: TIGHT
SUBJECTIVE PAIN PROGRESSION: IMPROVED
PAIN SEVERITY NOW: 3
QUALITY: SHARP

## 2024-10-11 ENCOUNTER — OFFICE VISIT (OUTPATIENT)
Dept: PHYSICAL THERAPY | Age: 59
End: 2024-10-11

## 2024-10-11 DIAGNOSIS — M25.532 LEFT WRIST PAIN: Primary | ICD-10-CM

## 2024-10-11 ASSESSMENT — ENCOUNTER SYMPTOMS
ALLEVIATING FACTORS: REST
QUALITY: STIFF
QUALITY: SHARP
PAIN SEVERITY NOW: 3
PAIN LOCATION: LEFT ULNAR WRIST

## 2024-10-22 ENCOUNTER — APPOINTMENT (OUTPATIENT)
Dept: PHYSICAL THERAPY | Age: 59
End: 2024-10-22

## 2024-10-22 DIAGNOSIS — M25.532 LEFT WRIST PAIN: Primary | ICD-10-CM

## 2024-10-22 ASSESSMENT — ENCOUNTER SYMPTOMS
QUALITY: TIGHT
PAIN LOCATION: LEFT ULNAR WRIST
ALLEVIATING FACTORS: ICE
PAIN SEVERITY NOW: 2
QUALITY: ACHE
ALLEVIATING FACTORS: REST

## 2024-10-24 ENCOUNTER — LAB ENCOUNTER (OUTPATIENT)
Dept: LAB | Age: 59
End: 2024-10-24
Attending: FAMILY MEDICINE
Payer: COMMERCIAL

## 2024-10-24 DIAGNOSIS — M10.9 GOUT, UNSPECIFIED: ICD-10-CM

## 2024-10-24 DIAGNOSIS — E78.5 HYPERLIPIDEMIA: ICD-10-CM

## 2024-10-24 DIAGNOSIS — R10.9 STOMACH ACHE: Primary | ICD-10-CM

## 2024-10-24 DIAGNOSIS — Z00.00 ROUTINE GENERAL MEDICAL EXAMINATION AT A HEALTH CARE FACILITY: ICD-10-CM

## 2024-10-24 LAB
ALBUMIN SERPL-MCNC: 4.9 G/DL (ref 3.2–4.8)
ALBUMIN/GLOB SERPL: 2.2 {RATIO} (ref 1–2)
ALP LIVER SERPL-CCNC: 100 U/L
ALT SERPL-CCNC: 30 U/L
AMYLASE SERPL-CCNC: 65 U/L (ref 30–118)
ANION GAP SERPL CALC-SCNC: 6 MMOL/L (ref 0–18)
AST SERPL-CCNC: 24 U/L (ref ?–34)
BASOPHILS # BLD AUTO: 0.03 X10(3) UL (ref 0–0.2)
BASOPHILS NFR BLD AUTO: 0.8 %
BILIRUB SERPL-MCNC: 2.9 MG/DL (ref 0.3–1.2)
BUN BLD-MCNC: 16 MG/DL (ref 9–23)
CALCIUM BLD-MCNC: 9.8 MG/DL (ref 8.7–10.4)
CHLORIDE SERPL-SCNC: 107 MMOL/L (ref 98–112)
CHOLEST SERPL-MCNC: 165 MG/DL (ref ?–200)
CO2 SERPL-SCNC: 28 MMOL/L (ref 21–32)
CREAT BLD-MCNC: 1.18 MG/DL
EGFRCR SERPLBLD CKD-EPI 2021: 72 ML/MIN/1.73M2 (ref 60–?)
EOSINOPHIL # BLD AUTO: 0.06 X10(3) UL (ref 0–0.7)
EOSINOPHIL NFR BLD AUTO: 1.6 %
ERYTHROCYTE [DISTWIDTH] IN BLOOD BY AUTOMATED COUNT: 13 %
EST. AVERAGE GLUCOSE BLD GHB EST-MCNC: 131 MG/DL (ref 68–126)
FASTING STATUS PATIENT QL REPORTED: YES
GLOBULIN PLAS-MCNC: 2.2 G/DL (ref 2–3.5)
GLUCOSE BLD-MCNC: 136 MG/DL (ref 70–99)
HBA1C MFR BLD: 6.2 % (ref ?–5.7)
HCT VFR BLD AUTO: 47 %
HDLC SERPL-MCNC: 44 MG/DL (ref 40–59)
HGB BLD-MCNC: 15.8 G/DL
IMM GRANULOCYTES # BLD AUTO: 0.01 X10(3) UL (ref 0–1)
IMM GRANULOCYTES NFR BLD: 0.3 %
LDLC SERPL CALC-MCNC: 80 MG/DL (ref ?–100)
LIPASE SERPL-CCNC: 41 U/L (ref 12–53)
LYMPHOCYTES # BLD AUTO: 1.66 X10(3) UL (ref 1–4)
LYMPHOCYTES NFR BLD AUTO: 45 %
MCH RBC QN AUTO: 29.1 PG (ref 26–34)
MCHC RBC AUTO-ENTMCNC: 33.6 G/DL (ref 31–37)
MCV RBC AUTO: 86.6 FL
MONOCYTES # BLD AUTO: 0.35 X10(3) UL (ref 0.1–1)
MONOCYTES NFR BLD AUTO: 9.5 %
NEUTROPHILS # BLD AUTO: 1.58 X10 (3) UL (ref 1.5–7.7)
NEUTROPHILS # BLD AUTO: 1.58 X10(3) UL (ref 1.5–7.7)
NEUTROPHILS NFR BLD AUTO: 42.8 %
NONHDLC SERPL-MCNC: 121 MG/DL (ref ?–130)
OSMOLALITY SERPL CALC.SUM OF ELEC: 295 MOSM/KG (ref 275–295)
PLATELET # BLD AUTO: 176 10(3)UL (ref 150–450)
POTASSIUM SERPL-SCNC: 4.3 MMOL/L (ref 3.5–5.1)
PROT SERPL-MCNC: 7.1 G/DL (ref 5.7–8.2)
RBC # BLD AUTO: 5.43 X10(6)UL
SODIUM SERPL-SCNC: 141 MMOL/L (ref 136–145)
TRIGL SERPL-MCNC: 249 MG/DL (ref 30–149)
URATE SERPL-MCNC: 7.2 MG/DL
VLDLC SERPL CALC-MCNC: 39 MG/DL (ref 0–30)
WBC # BLD AUTO: 3.7 X10(3) UL (ref 4–11)

## 2024-10-24 PROCEDURE — 80053 COMPREHEN METABOLIC PANEL: CPT

## 2024-10-24 PROCEDURE — 36415 COLL VENOUS BLD VENIPUNCTURE: CPT

## 2024-10-24 PROCEDURE — 82150 ASSAY OF AMYLASE: CPT

## 2024-10-24 PROCEDURE — 85025 COMPLETE CBC W/AUTO DIFF WBC: CPT

## 2024-10-24 PROCEDURE — 84550 ASSAY OF BLOOD/URIC ACID: CPT

## 2024-10-24 PROCEDURE — 80061 LIPID PANEL: CPT

## 2024-10-24 PROCEDURE — 83036 HEMOGLOBIN GLYCOSYLATED A1C: CPT

## 2024-10-24 PROCEDURE — 83690 ASSAY OF LIPASE: CPT

## 2024-10-29 ENCOUNTER — HOSPITAL ENCOUNTER (OUTPATIENT)
Dept: ULTRASOUND IMAGING | Age: 59
Discharge: HOME | End: 2024-10-29
Attending: FAMILY MEDICINE
Payer: COMMERCIAL

## 2024-10-29 DIAGNOSIS — R10.9 UNSPECIFIED ABDOMINAL PAIN: ICD-10-CM

## 2024-10-29 PROCEDURE — 76700 US EXAM ABDOM COMPLETE: CPT | Performed by: FAMILY MEDICINE

## 2024-10-31 ENCOUNTER — APPOINTMENT (OUTPATIENT)
Dept: PHYSICAL THERAPY | Age: 59
End: 2024-10-31

## 2024-10-31 DIAGNOSIS — M25.532 LEFT WRIST PAIN: Primary | ICD-10-CM

## 2024-11-12 ENCOUNTER — APPOINTMENT (OUTPATIENT)
Dept: PHYSICAL THERAPY | Age: 59
End: 2024-11-12

## 2024-11-12 DIAGNOSIS — M25.532 LEFT WRIST PAIN: Primary | ICD-10-CM

## 2024-11-12 PROCEDURE — 97112 NEUROMUSCULAR REEDUCATION: CPT | Performed by: OCCUPATIONAL THERAPIST

## 2024-11-12 PROCEDURE — 97110 THERAPEUTIC EXERCISES: CPT | Performed by: OCCUPATIONAL THERAPIST

## 2024-11-12 PROCEDURE — 97530 THERAPEUTIC ACTIVITIES: CPT | Performed by: OCCUPATIONAL THERAPIST

## 2024-11-12 ASSESSMENT — ENCOUNTER SYMPTOMS
ALLEVIATING FACTORS: REST
QUALITY: TIGHT
PAIN SCALE AT HIGHEST: 3
QUALITY: SORE
PAIN SEVERITY NOW: 2
PAIN LOCATION: LEFT ULNAR WRIST
ALLEVIATING FACTORS: ICE

## 2024-12-02 ENCOUNTER — APPOINTMENT (OUTPATIENT)
Dept: SPORTS MEDICINE | Age: 59
End: 2024-12-02

## 2024-12-02 VITALS
HEIGHT: 67 IN | DIASTOLIC BLOOD PRESSURE: 58 MMHG | WEIGHT: 198 LBS | SYSTOLIC BLOOD PRESSURE: 104 MMHG | BODY MASS INDEX: 31.08 KG/M2

## 2024-12-02 DIAGNOSIS — S69.82XD INJURY OF TRIANGULAR FIBROCARTILAGE COMPLEX (TFCC) OF LEFT WRIST, SUBSEQUENT ENCOUNTER: Primary | ICD-10-CM

## 2024-12-02 DIAGNOSIS — M25.832 ULNAR IMPACTION SYNDROME, LEFT: ICD-10-CM

## 2024-12-02 PROCEDURE — 99213 OFFICE O/P EST LOW 20 MIN: CPT | Performed by: INTERNAL MEDICINE

## 2024-12-02 ASSESSMENT — ENCOUNTER SYMPTOMS
CHILLS: 0
EYE PAIN: 0
CONSTIPATION: 0
SINUS PAIN: 0
DIARRHEA: 0
FEVER: 0
SEIZURES: 0
ABDOMINAL PAIN: 0
SHORTNESS OF BREATH: 0
WHEEZING: 0
SORE THROAT: 0
SLEEP DISTURBANCE: 0
WOUND: 0
NERVOUS/ANXIOUS: 0
COUGH: 0
BACK PAIN: 0

## 2025-02-07 ENCOUNTER — LAB ENCOUNTER (OUTPATIENT)
Dept: LAB | Age: 60
End: 2025-02-07
Attending: FAMILY MEDICINE
Payer: COMMERCIAL

## 2025-02-07 DIAGNOSIS — E11.9 DIABETES MELLITUS (HCC): Primary | ICD-10-CM

## 2025-02-07 LAB
ALBUMIN SERPL-MCNC: 4.7 G/DL (ref 3.2–4.8)
ALBUMIN/GLOB SERPL: 1.8 {RATIO} (ref 1–2)
ALP LIVER SERPL-CCNC: 88 U/L
ALT SERPL-CCNC: 23 U/L
ANION GAP SERPL CALC-SCNC: 10 MMOL/L (ref 0–18)
AST SERPL-CCNC: 18 U/L (ref ?–34)
BILIRUB SERPL-MCNC: 2.5 MG/DL (ref 0.3–1.2)
BUN BLD-MCNC: 22 MG/DL (ref 9–23)
CALCIUM BLD-MCNC: 9.9 MG/DL (ref 8.7–10.6)
CHLORIDE SERPL-SCNC: 102 MMOL/L (ref 98–112)
CHOLEST SERPL-MCNC: 169 MG/DL (ref ?–200)
CO2 SERPL-SCNC: 28 MMOL/L (ref 21–32)
CREAT BLD-MCNC: 1.17 MG/DL
EGFRCR SERPLBLD CKD-EPI 2021: 73 ML/MIN/1.73M2 (ref 60–?)
EST. AVERAGE GLUCOSE BLD GHB EST-MCNC: 123 MG/DL (ref 68–126)
FASTING PATIENT LIPID ANSWER: YES
FASTING STATUS PATIENT QL REPORTED: YES
GLOBULIN PLAS-MCNC: 2.6 G/DL (ref 2–3.5)
GLUCOSE BLD-MCNC: 134 MG/DL (ref 70–99)
HBA1C MFR BLD: 5.9 % (ref ?–5.7)
HDLC SERPL-MCNC: 47 MG/DL (ref 40–59)
LDLC SERPL CALC-MCNC: 86 MG/DL (ref ?–100)
NONHDLC SERPL-MCNC: 122 MG/DL (ref ?–130)
OSMOLALITY SERPL CALC.SUM OF ELEC: 295 MOSM/KG (ref 275–295)
POTASSIUM SERPL-SCNC: 4.2 MMOL/L (ref 3.5–5.1)
PROT SERPL-MCNC: 7.3 G/DL (ref 5.7–8.2)
SODIUM SERPL-SCNC: 140 MMOL/L (ref 136–145)
TRIGL SERPL-MCNC: 213 MG/DL (ref 30–149)
VIT D+METAB SERPL-MCNC: 32.8 NG/ML (ref 30–100)
VLDLC SERPL CALC-MCNC: 34 MG/DL (ref 0–30)

## 2025-02-07 PROCEDURE — 82306 VITAMIN D 25 HYDROXY: CPT

## 2025-02-07 PROCEDURE — 36415 COLL VENOUS BLD VENIPUNCTURE: CPT

## 2025-02-07 PROCEDURE — 80061 LIPID PANEL: CPT

## 2025-02-07 PROCEDURE — 83036 HEMOGLOBIN GLYCOSYLATED A1C: CPT

## 2025-02-07 PROCEDURE — 80053 COMPREHEN METABOLIC PANEL: CPT

## 2025-02-10 ENCOUNTER — ORDER TRANSCRIPTION (OUTPATIENT)
Dept: ADMINISTRATIVE | Facility: HOSPITAL | Age: 60
End: 2025-02-10

## 2025-02-10 DIAGNOSIS — E78.5 HYPERLIPIDEMIA: Primary | ICD-10-CM

## 2025-03-16 ENCOUNTER — HOSPITAL ENCOUNTER (OUTPATIENT)
Dept: CT IMAGING | Age: 60
Discharge: HOME | End: 2025-03-16
Attending: NURSE PRACTITIONER

## 2025-03-16 DIAGNOSIS — E78.5 HYPERLIPIDEMIA: ICD-10-CM

## 2025-03-21 ENCOUNTER — HOSPITAL ENCOUNTER (OUTPATIENT)
Dept: CV DIAGNOSTICS | Age: 60
Discharge: HOME | End: 2025-03-21
Attending: NURSE PRACTITIONER
Payer: COMMERCIAL

## 2025-03-21 DIAGNOSIS — R01.1 CARDIAC MURMUR: ICD-10-CM

## 2025-03-21 PROCEDURE — 93307 TTE W/O DOPPLER COMPLETE: CPT | Performed by: NURSE PRACTITIONER

## 2025-04-07 NOTE — PAT NURSING NOTE
Spoke with pt for PAT call. He denies any difficulty swallowing, no loose teeth/dentures, no recent abdominal surgeries.        PreOp Instructions     You are scheduled for: a Cardiac Procedure     Date of Procedure: 04/11/25     Diet Instructions: Do not eat or drink anything after midnight including gum, mints, candy, etc the night before your procedure.     Medications: Medications you are allowed to take can be taken with a sip of water the morning of your procedure.     Medications to Stop: Do not take any herbal supplements and vitamins the morning of your procedure.     Arrival Time: The day prior to your procedure you will receive a phone call between 3:00 pm - 6:00 pm with your arrival time. If you haven't received a phone call, please check your voicemail messages., If you did not receive a voice mail and it is after 6:00 pm, please call the nursing supervisor at [phone].    Driving After Procedure: Sedation will be given so you WILL NOT be able to drive home. You will need a responsible adult  to drive you home. You can NOT take uber or taxi unless approved by your physician in advance.     Discharge Teaching: Your nurse will give you specific instructions before discharge, Any questions, please call the physician's office

## 2025-04-11 ENCOUNTER — HOSPITAL ENCOUNTER (OUTPATIENT)
Dept: INTERVENTIONAL RADIOLOGY/VASCULAR | Facility: HOSPITAL | Age: 60
Discharge: HOME | End: 2025-04-11
Attending: INTERNAL MEDICINE | Admitting: INTERNAL MEDICINE
Payer: COMMERCIAL

## 2025-04-11 ENCOUNTER — HOSPITAL ENCOUNTER (OUTPATIENT)
Dept: CV DIAGNOSTICS | Facility: HOSPITAL | Age: 60
Discharge: HOME | End: 2025-04-11
Attending: INTERNAL MEDICINE
Payer: COMMERCIAL

## 2025-04-11 VITALS
OXYGEN SATURATION: 95 % | HEIGHT: 67 IN | TEMPERATURE: 96 F | DIASTOLIC BLOOD PRESSURE: 64 MMHG | SYSTOLIC BLOOD PRESSURE: 104 MMHG | WEIGHT: 180 LBS | HEART RATE: 54 BPM | RESPIRATION RATE: 11 BRPM | BODY MASS INDEX: 28.25 KG/M2

## 2025-04-11 DIAGNOSIS — I35.0 AORTIC STENOSIS WITH BICUSPID VALVE: ICD-10-CM

## 2025-04-11 DIAGNOSIS — I35.1 AORTIC VALVE REGURGITATION: ICD-10-CM

## 2025-04-11 DIAGNOSIS — Q23.81 AORTIC STENOSIS WITH BICUSPID VALVE: ICD-10-CM

## 2025-04-11 PROCEDURE — 93312 ECHO TRANSESOPHAGEAL: CPT | Performed by: STUDENT IN AN ORGANIZED HEALTH CARE EDUCATION/TRAINING PROGRAM

## 2025-04-11 PROCEDURE — 93320 DOPPLER ECHO COMPLETE: CPT | Performed by: INTERNAL MEDICINE

## 2025-04-11 PROCEDURE — 93325 DOPPLER ECHO COLOR FLOW MAPG: CPT | Performed by: INTERNAL MEDICINE

## 2025-04-11 PROCEDURE — 99152 MOD SED SAME PHYS/QHP 5/>YRS: CPT | Performed by: STUDENT IN AN ORGANIZED HEALTH CARE EDUCATION/TRAINING PROGRAM

## 2025-04-11 PROCEDURE — 99153 MOD SED SAME PHYS/QHP EA: CPT | Performed by: STUDENT IN AN ORGANIZED HEALTH CARE EDUCATION/TRAINING PROGRAM

## 2025-04-11 RX ORDER — SODIUM CHLORIDE 9 MG/ML
INJECTION, SOLUTION INTRAVENOUS
Status: COMPLETED | OUTPATIENT
Start: 2025-04-12 | End: 2025-04-11

## 2025-04-11 RX ORDER — MIDAZOLAM HYDROCHLORIDE 1 MG/ML
1 INJECTION INTRAMUSCULAR; INTRAVENOUS EVERY 5 MIN PRN
Status: DISCONTINUED | OUTPATIENT
Start: 2025-04-11 | End: 2025-04-11

## 2025-04-11 RX ORDER — MIDAZOLAM HYDROCHLORIDE 1 MG/ML
INJECTION INTRAMUSCULAR; INTRAVENOUS
Status: COMPLETED
Start: 2025-04-11 | End: 2025-04-11

## 2025-04-11 RX ADMIN — MIDAZOLAM HYDROCHLORIDE 3 MG: 1 INJECTION INTRAMUSCULAR; INTRAVENOUS at 09:05:00

## 2025-04-11 RX ADMIN — SODIUM CHLORIDE: 9 INJECTION, SOLUTION INTRAVENOUS at 08:17:00

## 2025-04-11 NOTE — PROGRESS NOTES
Pt s/p YANELIS. Pt tolerated procedure well. Suctioned clear secretions from mouth after procedure.VSS. Pt denied pain. Tolerated PO intake. IV d/c'd. Discharge instructions given-pt verbalized understanding. Pt to lobby via WC and wife to drive home.

## 2025-04-11 NOTE — H&P
Reviewed H&P. No changes. R/B/A D/W patient. Consent obtained. Proceed with YANELIS as planned.       Aakash Moore DO  Cardiologist  [facility]  4/11/2025 10:23 AM

## 2025-04-11 NOTE — PROCEDURES
Cardiology Transesophageal Echo Note    PRE-PROCEDURE DIAGNOSIS: Aortic insufficiency    PROCEDURE: Transesophageal Echocardiogram.    SEDATION:   Topical spray x 1  Versed: 3 mg  Fentanyl: 75 mcg  I personally supervised the intravenous administration of conscious sedation.  This was performed with continuous respiratory, hemodynamic, and electrocardiographic monitoring.  Sedation was supervised from 9532-1114.    DESCRIPTION:   Informed consent was obtained after risks and benefits of the procedure were explained. Oral hurricaine spray was placed in the oropharynx. Conscious sedation was given.  After adequate anesthesia, the YANELIS probe was placed in the oropharynx with the esophagus being successfully intubated. Standard transgastric and multiplane views were obtained and available findings are as follows:    COMPLICATIONS: none    FINDINGS:   Overall normal LV systolic function with estimated LVEF 60-65% without wall motion abnormalities. Chamber sizes were within normal limits.  No significant atrial enlargement.  Normal mitral, tricuspid and pulmonic valves were seen.   There appears to be a functionally bicuspid aortic valve with fusion of right and left coronary cusps.  There is associated at least moderate regurgitation which is very eccentric and anteriorly directed.  VCW 0.44 cm.  There is likely prolapse of the fused coronary cusps.  Known mixed valvular disease.  Please refer to transthoracic aortic valve gradients for further evaluation.  Aortic root dilation noted, estimated 4.0 cm.  There was no evidence for intracardiac mass or thrombus in the YUAN.  There was no evidence for reversal of flow in the pulmonary veins.   Color Doppler flow interrogation of the intra-atrial septum was performed and there was left-to-right intracardiac shunting to suggest an PFO.  Interatrial septum had aneurysmal appearance.  There was no significant scattered atherosclerotic plaque in the visualized aorta without  evidence for mobile atheromata or thrombus.  No pericardial effusion.        Aakash Moore DO  4/11/2025  10:26 AM

## 2025-05-01 ENCOUNTER — LAB ENCOUNTER (OUTPATIENT)
Dept: LAB | Age: 60
End: 2025-05-01
Attending: INTERNAL MEDICINE
Payer: COMMERCIAL

## 2025-05-01 DIAGNOSIS — I71.9 HYALINE NECROSIS OF AORTA: Primary | ICD-10-CM

## 2025-05-01 LAB
BUN BLD-MCNC: 17 MG/DL (ref 9–23)
CREAT BLD-MCNC: 1.12 MG/DL (ref 0.7–1.3)
EGFRCR SERPLBLD CKD-EPI 2021: 77 ML/MIN/1.73M2 (ref 60–?)

## 2025-05-01 PROCEDURE — 82565 ASSAY OF CREATININE: CPT

## 2025-05-01 PROCEDURE — 36415 COLL VENOUS BLD VENIPUNCTURE: CPT

## 2025-05-01 PROCEDURE — 84520 ASSAY OF UREA NITROGEN: CPT

## 2025-06-13 ENCOUNTER — HOSPITAL ENCOUNTER (OUTPATIENT)
Dept: LAB | Facility: HOSPITAL | Age: 60
Discharge: HOME | End: 2025-06-13
Attending: THORACIC SURGERY (CARDIOTHORACIC VASCULAR SURGERY)
Payer: COMMERCIAL

## 2025-06-13 ENCOUNTER — HOSPITAL ENCOUNTER (OUTPATIENT)
Dept: CARDIOLOGY CLINIC | Facility: HOSPITAL | Age: 60
Discharge: HOME | End: 2025-06-13
Attending: THORACIC SURGERY (CARDIOTHORACIC VASCULAR SURGERY)
Payer: COMMERCIAL

## 2025-06-13 ENCOUNTER — HOSPITAL ENCOUNTER (OUTPATIENT)
Dept: GENERAL RADIOLOGY | Facility: HOSPITAL | Age: 60
Discharge: HOME | End: 2025-06-13
Attending: THORACIC SURGERY (CARDIOTHORACIC VASCULAR SURGERY)
Payer: COMMERCIAL

## 2025-06-13 ENCOUNTER — HOSPITAL ENCOUNTER (OUTPATIENT)
Dept: CV DIAGNOSTICS | Facility: HOSPITAL | Age: 60
Discharge: HOME | End: 2025-06-13
Attending: THORACIC SURGERY (CARDIOTHORACIC VASCULAR SURGERY)
Payer: COMMERCIAL

## 2025-06-13 ENCOUNTER — HOSPITAL ENCOUNTER (OUTPATIENT)
Dept: INTERVENTIONAL RADIOLOGY/VASCULAR | Facility: HOSPITAL | Age: 60
Discharge: HOME | End: 2025-06-13
Attending: THORACIC SURGERY (CARDIOTHORACIC VASCULAR SURGERY)
Payer: COMMERCIAL

## 2025-06-13 DIAGNOSIS — Z01.818 PRE-OP TESTING: ICD-10-CM

## 2025-06-13 DIAGNOSIS — I35.0 AORTIC STENOSIS: ICD-10-CM

## 2025-06-13 DIAGNOSIS — I71.21 ASCENDING AORTIC ANEURYSM: ICD-10-CM

## 2025-06-13 DIAGNOSIS — Q21.12 PFO (PATENT FORAMEN OVALE) (HCC): ICD-10-CM

## 2025-06-13 LAB
ALBUMIN SERPL-MCNC: 4.9 G/DL (ref 3.2–4.8)
ALBUMIN/GLOB SERPL: 2.3 {RATIO} (ref 1–2)
ALP LIVER SERPL-CCNC: 86 U/L (ref 45–117)
ALT SERPL-CCNC: 27 U/L (ref 10–49)
ANION GAP SERPL CALC-SCNC: 8 MMOL/L (ref 0–18)
ANTIBODY SCREEN: NEGATIVE
APTT PPP: 27.2 SECONDS (ref 23–36)
AST SERPL-CCNC: 23 U/L (ref ?–34)
BASOPHILS # BLD AUTO: 0.03 X10(3) UL (ref 0–0.2)
BASOPHILS NFR BLD AUTO: 0.8 %
BILIRUB SERPL-MCNC: 2.5 MG/DL (ref 0.3–1.2)
BILIRUB UR QL STRIP.AUTO: NEGATIVE
BUN BLD-MCNC: 12 MG/DL (ref 9–23)
CALCIUM BLD-MCNC: 9.6 MG/DL (ref 8.7–10.6)
CHLORIDE SERPL-SCNC: 104 MMOL/L (ref 98–112)
CLARITY UR REFRACT.AUTO: CLEAR
CO2 SERPL-SCNC: 27 MMOL/L (ref 21–32)
COLOR UR AUTO: COLORLESS
CREAT BLD-MCNC: 1.28 MG/DL (ref 0.7–1.3)
EGFRCR SERPLBLD CKD-EPI 2021: 65 ML/MIN/1.73M2 (ref 60–?)
EOSINOPHIL # BLD AUTO: 0.05 X10(3) UL (ref 0–0.7)
EOSINOPHIL NFR BLD AUTO: 1.3 %
ERYTHROCYTE [DISTWIDTH] IN BLOOD BY AUTOMATED COUNT: 13.1 %
EST. AVERAGE GLUCOSE BLD GHB EST-MCNC: 126 MG/DL (ref 68–126)
FASTING STATUS PATIENT QL REPORTED: NO
GLOBULIN PLAS-MCNC: 2.1 G/DL (ref 2–3.5)
GLUCOSE BLD-MCNC: 116 MG/DL (ref 70–99)
GLUCOSE UR STRIP.AUTO-MCNC: NORMAL MG/DL
HBA1C MFR BLD: 6 % (ref ?–5.7)
HCT VFR BLD AUTO: 46.3 % (ref 39–53)
HGB BLD-MCNC: 15.9 G/DL (ref 13–17.5)
IMM GRANULOCYTES # BLD AUTO: 0.01 X10(3) UL (ref 0–1)
IMM GRANULOCYTES NFR BLD: 0.3 %
INR BLD: 0.99 (ref 0.8–1.2)
KETONES UR STRIP.AUTO-MCNC: NEGATIVE MG/DL
LEUKOCYTE ESTERASE UR QL STRIP.AUTO: 25
LYMPHOCYTES # BLD AUTO: 1.78 X10(3) UL (ref 1–4)
LYMPHOCYTES NFR BLD AUTO: 46 %
MCH RBC QN AUTO: 29.7 PG (ref 26–34)
MCHC RBC AUTO-ENTMCNC: 34.3 G/DL (ref 31–37)
MCV RBC AUTO: 86.5 FL (ref 80–100)
MONOCYTES # BLD AUTO: 0.28 X10(3) UL (ref 0.1–1)
MONOCYTES NFR BLD AUTO: 7.2 %
NEUTROPHILS # BLD AUTO: 1.72 X10 (3) UL (ref 1.5–7.7)
NEUTROPHILS # BLD AUTO: 1.72 X10(3) UL (ref 1.5–7.7)
NEUTROPHILS NFR BLD AUTO: 44.4 %
NITRITE UR QL STRIP.AUTO: NEGATIVE
OSMOLALITY SERPL CALC.SUM OF ELEC: 289 MOSM/KG (ref 275–295)
PH UR STRIP.AUTO: 6 [PH] (ref 5–8)
PLATELET # BLD AUTO: 167 10(3)UL (ref 150–450)
POTASSIUM SERPL-SCNC: 4.4 MMOL/L (ref 3.5–5.1)
PROT SERPL-MCNC: 7 G/DL (ref 5.7–8.2)
PROT UR STRIP.AUTO-MCNC: NEGATIVE MG/DL
PROTHROMBIN TIME: 13.2 SECONDS (ref 11.6–14.8)
RBC # BLD AUTO: 5.35 X10(6)UL (ref 4.3–5.7)
RBC UR QL AUTO: NEGATIVE
RH BLOOD TYPE: POSITIVE
SODIUM SERPL-SCNC: 139 MMOL/L (ref 136–145)
SP GR UR STRIP.AUTO: 1.01 (ref 1–1.03)
UROBILINOGEN UR STRIP.AUTO-MCNC: NORMAL MG/DL
WBC # BLD AUTO: 3.9 X10(3) UL (ref 4–11)

## 2025-06-13 PROCEDURE — 36415 COLL VENOUS BLD VENIPUNCTURE: CPT | Performed by: THORACIC SURGERY (CARDIOTHORACIC VASCULAR SURGERY)

## 2025-06-13 PROCEDURE — 86920 COMPATIBILITY TEST SPIN: CPT

## 2025-06-13 PROCEDURE — 87086 URINE CULTURE/COLONY COUNT: CPT | Performed by: THORACIC SURGERY (CARDIOTHORACIC VASCULAR SURGERY)

## 2025-06-13 PROCEDURE — 85610 PROTHROMBIN TIME: CPT | Performed by: THORACIC SURGERY (CARDIOTHORACIC VASCULAR SURGERY)

## 2025-06-13 PROCEDURE — 93010 ELECTROCARDIOGRAM REPORT: CPT | Performed by: INTERNAL MEDICINE

## 2025-06-13 PROCEDURE — 85730 THROMBOPLASTIN TIME PARTIAL: CPT | Performed by: THORACIC SURGERY (CARDIOTHORACIC VASCULAR SURGERY)

## 2025-06-13 PROCEDURE — 86850 RBC ANTIBODY SCREEN: CPT | Performed by: THORACIC SURGERY (CARDIOTHORACIC VASCULAR SURGERY)

## 2025-06-13 PROCEDURE — 83036 HEMOGLOBIN GLYCOSYLATED A1C: CPT | Performed by: THORACIC SURGERY (CARDIOTHORACIC VASCULAR SURGERY)

## 2025-06-13 PROCEDURE — 86901 BLOOD TYPING SEROLOGIC RH(D): CPT | Performed by: THORACIC SURGERY (CARDIOTHORACIC VASCULAR SURGERY)

## 2025-06-13 PROCEDURE — 71045 X-RAY EXAM CHEST 1 VIEW: CPT | Performed by: THORACIC SURGERY (CARDIOTHORACIC VASCULAR SURGERY)

## 2025-06-13 PROCEDURE — 85025 COMPLETE CBC W/AUTO DIFF WBC: CPT | Performed by: THORACIC SURGERY (CARDIOTHORACIC VASCULAR SURGERY)

## 2025-06-13 PROCEDURE — 80053 COMPREHEN METABOLIC PANEL: CPT | Performed by: THORACIC SURGERY (CARDIOTHORACIC VASCULAR SURGERY)

## 2025-06-13 PROCEDURE — 81001 URINALYSIS AUTO W/SCOPE: CPT | Performed by: THORACIC SURGERY (CARDIOTHORACIC VASCULAR SURGERY)

## 2025-06-13 PROCEDURE — 93005 ELECTROCARDIOGRAM TRACING: CPT

## 2025-06-13 PROCEDURE — 86900 BLOOD TYPING SEROLOGIC ABO: CPT | Performed by: THORACIC SURGERY (CARDIOTHORACIC VASCULAR SURGERY)

## 2025-06-13 NOTE — CM/SW NOTE
Met with patient, accompanied by spouse per protocol to discuss discharge planning as patient is scheduled to have AVR with Dr Stern on 6/24/2025.  Patient, who goes by Jese, works in the insurance world.  He resides wit6h his spouse in a two story house in [place], along with their two dogs--Homa and Sunshine.  Jese and his spouse Jessica have been  for 35 years.  There are approximately 13 to 14 steps to get to the bedrooms in their home.  Jese drives, uses glasses and does not use any assistive or respiratory DME.  He has never smoked, and socially drinks.  As for interests and hobbies, Jese enjoys spending quality time with his two grandkids--a 2 month old locally and the other grandkid who is 1 1/2 years old who lives in [place] but will be coming up with parents when Sabi has his surgery.  Patient also enjoys playing dog frisbe with the dogs.  PCP is Dr Nidhi Willis; uses [facility] [place] to fill prescriptions.    Discussed what to expect day of post surgery.  Talked about how therapies to work with him and customize a plan for discharge.  Explained role of HHC--specifically mentioning preferred providers[facility].  Both patient and spouse Jessica in favor of having this service arranged within their insurance network.  Jese aware of 10# lifting restriction; Jessica to be @ home with him so not to be home alone.  All questions addressed and answered; emotional support given.  CM/SW to   06/13/25 1000   CM/SW Referral Data   Referral Source Physician;   Reason for Referral Discharge planning;Protocol order set   Specify order set Other  (avr)   Informant Patient;Spouse/Significant Other   Medical Hx   Does patient have an established PCP? Yes   Patient Info   Patient's Current Mental Status at Time of Assessment Alert;Oriented   Patient's Home Environment House   Number of Levels in Home 2   Number of Stair in Home 13-14 steps to bedrooms on second floor    Patient lives with Spouse/Significant other;Other  (2 dogs)   Patient Status Prior to Admission   Independent with ADLs and Mobility No   Discharge Needs   Anticipated D/C needs Home health care      remain available for any additional discharge needs

## 2025-06-13 NOTE — DISCHARGE INSTRUCTIONS
Sometimes managing your health at home requires assistance.  The [facility] team has recognized your preference to use [facility].  They can be reached by phone at [phone].  The fax number for your reference is [phone].  A representative from the home health agency will contact you or your family to schedule your first visit.

## 2025-06-13 NOTE — PAT NURSING NOTE
PAT nursing note: met with patient and spouse Jessica in [facility] to review pre-surgical instructions for his upcoming surgery with Dr. Stern on 6/24; testing underway; reviewed binder, medication stop dates, shower schedule/instructions; npo after 2200; continue to take all prescribed medications as directed except: the morning of surgery do not take any medication; last dose of vitamins, supplements, Omega 3, herbal products and NSAIDs 6/16; denies taking ASA, blood thinners, beta blockers, ACEs, ARBs, diabetic or weight loss medications; no PPM, ICD, no nerve or spinal cord stimulator; admit 5-7 days; visitors welcome; park in the [facility] garage at [facility]; register at the [facility] reception desk in the [facility] at 0530 am; keep personal possessions to a minimum: bring insurance card(s)/picture ID and binder, toiletries, wear comfortable clothing, do not wear contacts-bring glasses/eye glass case, bring denture cup/supplies; leave all valuables at home, including jewelry; discussed intra-op and post-op instructions; all questions answered; patient and spouse verbalized understanding of all instructions.

## 2025-06-13 NOTE — PROGRESS NOTES
Met with patient and wife in PAT to discuss pre op teaching, post op expectations, discharge planning.  Discussed roles of CM/SW, PT/OT, Cardiac rehab in education and recovery.  All questions answered, binder given.  Discussed typical post op and at home recovery, pt works from home, plans to ease back into it as he can, wife will be home to assist at discharge. Pt has two adult children, son is a dermatology PA in [place], daughter is a pediatric ICU RN locally. Confirmed pt has chosen TISSUE VALVE, is aware of the strong possibility of ascending aortic replacement as well.  Will follow up post op.  Of note, pt would like to follow up with Dr. Henley in [place], will arrange.  Gricelda Murray RN  Clinical Coordinator  [facility]

## 2025-06-15 LAB
ATRIAL RATE: 67 BPM
P AXIS: 46 DEGREES
P-R INTERVAL: 158 MS
Q-T INTERVAL: 410 MS
QRS DURATION: 84 MS
QTC CALCULATION (BEZET): 433 MS
R AXIS: -7 DEGREES
T AXIS: -6 DEGREES
VENTRICULAR RATE: 67 BPM

## 2025-06-23 ENCOUNTER — ANESTHESIA EVENT (OUTPATIENT)
Dept: CARDIAC SURGERY | Facility: HOSPITAL | Age: 60
End: 2025-06-23
Payer: COMMERCIAL

## 2025-06-24 ENCOUNTER — HOSPITAL ENCOUNTER (INPATIENT)
Facility: HOSPITAL | Age: 60
LOS: 4 days | Discharge: HOME HEALTH | End: 2025-06-28
Attending: THORACIC SURGERY (CARDIOTHORACIC VASCULAR SURGERY) | Admitting: THORACIC SURGERY (CARDIOTHORACIC VASCULAR SURGERY)
Payer: COMMERCIAL

## 2025-06-24 ENCOUNTER — ANESTHESIA (OUTPATIENT)
Dept: CARDIAC SURGERY | Facility: HOSPITAL | Age: 60
End: 2025-06-24
Payer: COMMERCIAL

## 2025-06-24 ENCOUNTER — APPOINTMENT (OUTPATIENT)
Dept: GENERAL RADIOLOGY | Facility: HOSPITAL | Age: 60
End: 2025-06-24
Attending: PHYSICIAN ASSISTANT
Payer: COMMERCIAL

## 2025-06-24 DIAGNOSIS — G89.18 POST-OP PAIN: ICD-10-CM

## 2025-06-24 DIAGNOSIS — Z01.818 PRE-OP TESTING: ICD-10-CM

## 2025-06-24 DIAGNOSIS — Q21.12 PFO (PATENT FORAMEN OVALE) (HCC): ICD-10-CM

## 2025-06-24 DIAGNOSIS — I71.21 ASCENDING AORTIC ANEURYSM: ICD-10-CM

## 2025-06-24 DIAGNOSIS — I35.0 AORTIC STENOSIS: Primary | ICD-10-CM

## 2025-06-24 DIAGNOSIS — J90 PLEURAL EFFUSION: ICD-10-CM

## 2025-06-24 LAB
APTT PPP: 24.4 SECONDS (ref 23–36)
APTT PPP: 25.7 SECONDS (ref 23–36)
ATRIAL RATE: 88 BPM
BASE EXCESS BLD CALC-SCNC: -2 MMOL/L
BASE EXCESS BLD CALC-SCNC: -3 MMOL/L
BASE EXCESS BLD CALC-SCNC: 0 MMOL/L
BASE EXCESS BLDA CALC-SCNC: -0.4 MMOL/L (ref ?–2)
BASE EXCESS BLDA CALC-SCNC: -0.5 MMOL/L (ref ?–2)
BASE EXCESS BLDA CALC-SCNC: -2 MMOL/L (ref ?–30)
BASE EXCESS BLDMV CALC-SCNC: -2 MMOL/L (ref ?–30)
BASE EXCESS BLDMV CALC-SCNC: -4 MMOL/L (ref ?–30)
BASE EXCESS BLDMV CALC-SCNC: 0 MMOL/L (ref ?–30)
BASE EXCESS BLDV CALC-SCNC: -1 MMOL/L (ref ?–30)
BASE EXCESS BLDV CALC-SCNC: -2 MMOL/L (ref ?–30)
BASE EXCESS BLDV CALC-SCNC: 0 MMOL/L (ref ?–30)
BODY TEMPERATURE: 98.6 F
BODY TEMPERATURE: 98.6 F
BUN BLD-MCNC: 14 MG/DL (ref 9–23)
CA-I BLD-SCNC: 1.13 MMOL/L (ref 1.12–1.32)
CA-I BLD-SCNC: 1.22 MMOL/L (ref 1.12–1.32)
CA-I BLD-SCNC: 1.29 MMOL/L (ref 1.12–1.32)
CA-I BLDA-SCNC: 1.58 MMOL/L (ref 1.12–1.32)
CA-I BLDMV-SCNC: 1.06 MMOL/L (ref 1.12–1.32)
CA-I BLDMV-SCNC: 1.07 MMOL/L (ref 1.12–1.32)
CA-I BLDMV-SCNC: 1.08 MMOL/L (ref 1.12–1.32)
CA-I BLDV-SCNC: 1.06 MMOL/L (ref 1.12–1.32)
CA-I BLDV-SCNC: 1.47 MMOL/L (ref 1.12–1.32)
CA-I BLDV-SCNC: 1.48 MMOL/L (ref 1.12–1.32)
CA-I BLDV-SCNC: 1.5 MMOL/L (ref 1.12–1.32)
CA-I BLDV-SCNC: 1.5 MMOL/L (ref 1.12–1.32)
CALCIUM BLD-MCNC: 9.4 MG/DL (ref 8.7–10.6)
CHLORIDE SERPL-SCNC: 108 MMOL/L (ref 98–112)
CO2 BLD-SCNC: 22 MMOL/L (ref 22–32)
CO2 BLD-SCNC: 25 MMOL/L (ref 22–32)
CO2 BLD-SCNC: 26 MMOL/L (ref 22–32)
CO2 BLDA-SCNC: 25 MMOL/L (ref 22–32)
CO2 BLDMV-SCNC: 24 MMOL/L (ref 22–32)
CO2 BLDMV-SCNC: 25 MMOL/L (ref 22–32)
CO2 BLDMV-SCNC: 27 MMOL/L (ref 22–32)
CO2 BLDV-SCNC: 24 MMOL/L (ref 22–32)
CO2 BLDV-SCNC: 25 MMOL/L (ref 22–32)
CO2 BLDV-SCNC: 26 MMOL/L (ref 22–32)
CO2 SERPL-SCNC: 26 MMOL/L (ref 21–32)
COHGB MFR BLD: 1.3 % SAT (ref 0–3)
COHGB MFR BLD: 1.6 % SAT (ref 0–3)
CREAT BLD-MCNC: 1.09 MG/DL (ref 0.7–1.3)
EGFRCR SERPLBLD CKD-EPI 2021: 79 ML/MIN/1.73M2 (ref 60–?)
ERYTHROCYTE [DISTWIDTH] IN BLOOD BY AUTOMATED COUNT: 12.8 %
FIBRINOGEN PPP-MCNC: 183 MG/DL (ref 200–480)
FIBRINOGEN PPP-MCNC: 315 MG/DL (ref 200–480)
FIO2: 50 %
GLUCOSE BLD-MCNC: 106 MG/DL (ref 70–99)
GLUCOSE BLD-MCNC: 111 MG/DL (ref 70–99)
GLUCOSE BLD-MCNC: 114 MG/DL (ref 70–99)
GLUCOSE BLD-MCNC: 124 MG/DL (ref 70–99)
GLUCOSE BLD-MCNC: 128 MG/DL (ref 70–99)
GLUCOSE BLD-MCNC: 133 MG/DL (ref 70–99)
GLUCOSE BLD-MCNC: 135 MG/DL (ref 70–99)
GLUCOSE BLD-MCNC: 139 MG/DL (ref 70–99)
GLUCOSE BLD-MCNC: 139 MG/DL (ref 70–99)
GLUCOSE BLD-MCNC: 145 MG/DL (ref 70–99)
GLUCOSE BLD-MCNC: 146 MG/DL (ref 70–99)
GLUCOSE BLD-MCNC: 146 MG/DL (ref 70–99)
GLUCOSE BLD-MCNC: 156 MG/DL (ref 70–99)
GLUCOSE BLD-MCNC: 156 MG/DL (ref 70–99)
GLUCOSE BLD-MCNC: 166 MG/DL (ref 70–99)
GLUCOSE BLD-MCNC: 167 MG/DL (ref 70–99)
GLUCOSE BLD-MCNC: 176 MG/DL (ref 70–99)
GLUCOSE BLD-MCNC: 188 MG/DL (ref 70–99)
GLUCOSE BLDA-MCNC: 173 MG/DL (ref 70–99)
GLUCOSE BLDV-MCNC: 173 MG/DL (ref 70–99)
GLUCOSE BLDV-MCNC: 175 MG/DL (ref 70–99)
GLUCOSE BLDV-MCNC: 177 MG/DL (ref 70–99)
GLUCOSE BLDV-MCNC: 182 MG/DL (ref 70–99)
GLUCOSE BLDV-MCNC: 186 MG/DL (ref 70–99)
HCO3 BLD-SCNC: 21.4 MEQ/L
HCO3 BLD-SCNC: 23.4 MEQ/L
HCO3 BLD-SCNC: 25.1 MEQ/L
HCO3 BLDA-SCNC: 23.3 MEQ/L (ref 22–26)
HCO3 BLDA-SCNC: 23.4 MEQ/L (ref 22–26)
HCO3 BLDA-SCNC: 23.7 MEQ/L (ref 22–26)
HCO3 BLDA-SCNC: 23.8 MEQ/L (ref 22–26)
HCO3 BLDA-SCNC: 23.9 MEQ/L (ref 22–26)
HCO3 BLDA-SCNC: 24.4 MEQ/L (ref 22–26)
HCO3 BLDA-SCNC: 24.6 MEQ/L (ref 21–27)
HCO3 BLDA-SCNC: 24.6 MEQ/L (ref 21–27)
HCO3 BLDMV-SCNC: 22.2 MEQ/L (ref 22–26)
HCO3 BLDMV-SCNC: 24 MEQ/L (ref 22–26)
HCO3 BLDMV-SCNC: 25.9 MEQ/L (ref 22–26)
HCT VFR BLD AUTO: 30.8 % (ref 39–53)
HCT VFR BLD CALC: 28 % (ref 37–53)
HCT VFR BLD CALC: 29 % (ref 37–53)
HCT VFR BLD CALC: 36 % (ref 37–53)
HCT VFR BLDA CALC: 28 % (ref 37–53)
HCT VFR BLDMV CALC: 28 % (ref 37–53)
HCT VFR BLDMV CALC: 30 % (ref 37–53)
HCT VFR BLDMV CALC: 30 % (ref 37–53)
HCT VFR BLDV CALC: 28 % (ref 37–53)
HCT VFR BLDV CALC: 29 % (ref 37–53)
HCT VFR BLDV CALC: 30 % (ref 37–53)
HGB BLD-MCNC: 10.6 G/DL (ref 13–17.5)
HGB BLD-MCNC: 10.9 G/DL (ref 13–17.5)
HGB BLD-MCNC: 11.1 G/DL (ref 13–17.5)
INR BLD: 1.53 (ref 0.8–1.2)
INR BLD: <0.69 (ref 0.8–1.2)
ISTAT ACTIVATED CLOTTING TIME: 124 SECONDS (ref 74–137)
ISTAT ACTIVATED CLOTTING TIME: 383 SECONDS (ref 74–137)
ISTAT ACTIVATED CLOTTING TIME: 458 SECONDS (ref 74–137)
ISTAT ACTIVATED CLOTTING TIME: 481 SECONDS (ref 74–137)
ISTAT ACTIVATED CLOTTING TIME: 671 SECONDS (ref 74–137)
ISTAT ACTIVATED CLOTTING TIME: 717 SECONDS (ref 74–137)
ISTAT ACTIVATED CLOTTING TIME: 792 SECONDS (ref 74–137)
ISTAT ACTIVATED CLOTTING TIME: 95 SECONDS (ref 74–137)
ISTAT PATIENT TEMPERATURE: 24 DEGREE
ISTAT PATIENT TEMPERATURE: 24 DEGREE
ISTAT PATIENT TEMPERATURE: 26 DEGREE
ISTAT PATIENT TEMPERATURE: 31 DEGREE
ISTAT PATIENT TEMPERATURE: 35 DEGREE
MAGNESIUM SERPL-MCNC: 2.2 MG/DL (ref 1.6–2.6)
MCH RBC QN AUTO: 29.4 PG (ref 26–34)
MCHC RBC AUTO-ENTMCNC: 34.4 G/DL (ref 31–37)
MCV RBC AUTO: 85.3 FL (ref 80–100)
METHGB MFR BLD: 0 % SAT (ref 0.4–1.5)
METHGB MFR BLD: 0.5 % SAT (ref 0.4–1.5)
MRSA DNA SPEC QL NAA+PROBE: NEGATIVE
OXYHGB MFR BLDA: 97.6 % (ref 92–100)
OXYHGB MFR BLDA: 98.4 % (ref 92–100)
P AXIS: 16 DEGREES
P-R INTERVAL: 130 MS
PCO2 BLD: 34.9 MMHG
PCO2 BLD: 39.1 MMHG
PCO2 BLD: 39.7 MMHG
PCO2 BLDA: 39 MM HG (ref 35–45)
PCO2 BLDA: 40.8 MMHG (ref 35–45)
PCO2 BLDA: 42 MM HG (ref 35–45)
PCO2 BLDMV: 28.2 MMHG (ref 38–50)
PCO2 BLDMV: 28.4 MMHG (ref 38–50)
PCO2 BLDMV: 33.5 MMHG (ref 38–50)
PCO2 BLDV: 28.6 MMHG (ref 38–50)
PCO2 BLDV: 30.4 MMHG (ref 38–50)
PCO2 BLDV: 41.5 MMHG (ref 38–50)
PCO2 BLDV: 42 MMHG (ref 38–50)
PCO2 BLDV: 42 MMHG (ref 38–50)
PEEP: 5 CM H2O
PH BLD: 7.38 [PH]
PH BLD: 7.4 [PH]
PH BLD: 7.41 [PH]
PH BLDA: 7.37 [PH] (ref 7.35–7.45)
PH BLDA: 7.38 [PH] (ref 7.35–7.45)
PH BLDA: 7.4 [PH] (ref 7.35–7.45)
PH BLDMV: 7.44 [PH] (ref 7.32–7.43)
PH BLDMV: 7.45 [PH] (ref 7.32–7.43)
PH BLDMV: 7.51 [PH] (ref 7.32–7.43)
PH BLDV: 7.36 [PH] (ref 7.32–7.43)
PH BLDV: 7.36 [PH] (ref 7.32–7.43)
PH BLDV: 7.37 [PH] (ref 7.32–7.43)
PH BLDV: 7.47 [PH] (ref 7.32–7.43)
PH BLDV: 7.49 [PH] (ref 7.32–7.43)
PLATELET # BLD AUTO: 104 10(3)UL (ref 150–450)
PLATELET # BLD AUTO: 167 10(3)UL (ref 150–450)
PO2 BLD: 130 MMHG
PO2 BLD: 311 MMHG
PO2 BLD: 340 MMHG
PO2 BLDA: 125 MM HG (ref 80–100)
PO2 BLDA: 143 MM HG (ref 80–100)
PO2 BLDA: 330 MMHG (ref 80–105)
PO2 BLDMV: <70 MMHG (ref 35–40)
PO2 BLDV: 359 MMHG (ref 35–40)
PO2 BLDV: <70 MMHG (ref 35–40)
POTASSIUM BLD-SCNC: 3.4 MMOL/L (ref 3.6–5.1)
POTASSIUM BLD-SCNC: 3.5 MMOL/L (ref 3.6–5.1)
POTASSIUM BLD-SCNC: 3.6 MMOL/L (ref 3.6–5.1)
POTASSIUM SERPL-SCNC: 3.4 MMOL/L (ref 3.5–5.1)
PROTHROMBIN TIME: 18.5 SECONDS (ref 11.6–14.8)
PROTHROMBIN TIME: <10 SECONDS (ref 11.6–14.8)
Q-T INTERVAL: 410 MS
QRS DURATION: 100 MS
QTC CALCULATION (BEZET): 496 MS
R AXIS: -2 DEGREES
RBC # BLD AUTO: 3.61 X10(6)UL (ref 4.3–5.7)
RH BLOOD TYPE: POSITIVE
SAO2 % BLD: 100 %
SAO2 % BLD: 100 %
SAO2 % BLD: 99 %
SAO2 % BLDA: 100 % (ref 92–100)
SAO2 % BLDMV: 85 % (ref 60–85)
SAO2 % BLDMV: 91 % (ref 60–85)
SAO2 % BLDMV: 92 % (ref 60–85)
SAO2 % BLDV: 100 % (ref 60–85)
SAO2 % BLDV: 76 % (ref 60–85)
SAO2 % BLDV: 79 % (ref 60–85)
SAO2 % BLDV: 80 % (ref 60–85)
SAO2 % BLDV: 91 % (ref 60–85)
SODIUM BLD-SCNC: 140 MMOL/L (ref 136–145)
SODIUM BLD-SCNC: 143 MMOL/L (ref 136–145)
SODIUM BLD-SCNC: 144 MMOL/L (ref 136–145)
SODIUM BLDA-SCNC: 140 MMOL/L (ref 136–145)
SODIUM BLDA-SCNC: 4.2 MMOL/L (ref 3.6–5.1)
SODIUM BLDMV-SCNC: 136 MMOL/L (ref 136–145)
SODIUM BLDMV-SCNC: 138 MMOL/L (ref 136–145)
SODIUM BLDMV-SCNC: 138 MMOL/L (ref 136–145)
SODIUM BLDMV-SCNC: 3.8 MMOL/L (ref 3.6–5.1)
SODIUM BLDMV-SCNC: 5.2 MMOL/L (ref 3.6–5.1)
SODIUM BLDMV-SCNC: 5.2 MMOL/L (ref 3.6–5.1)
SODIUM BLDV-SCNC: 137 MMOL/L (ref 136–145)
SODIUM BLDV-SCNC: 139 MMOL/L (ref 136–145)
SODIUM BLDV-SCNC: 139 MMOL/L (ref 136–145)
SODIUM BLDV-SCNC: 140 MMOL/L (ref 136–145)
SODIUM BLDV-SCNC: 140 MMOL/L (ref 136–145)
SODIUM BLDV-SCNC: 4.7 MMOL/L (ref 3.6–5.1)
SODIUM BLDV-SCNC: 4.8 MMOL/L (ref 3.6–5.1)
SODIUM BLDV-SCNC: 5 MMOL/L (ref 3.6–5.1)
SODIUM BLDV-SCNC: 5 MMOL/L (ref 3.6–5.1)
SODIUM BLDV-SCNC: 5.6 MMOL/L (ref 3.6–5.1)
SODIUM SERPL-SCNC: 145 MMOL/L (ref 136–145)
T AXIS: 49 DEGREES
TIDAL VOLUME: 500 ML
VENT RATE: 16 /MIN
VENTRICULAR RATE: 88 BPM
WBC # BLD AUTO: 4.5 X10(3) UL (ref 4–11)

## 2025-06-24 PROCEDURE — 36430 TRANSFUSION BLD/BLD COMPNT: CPT | Performed by: STUDENT IN AN ORGANIZED HEALTH CARE EDUCATION/TRAINING PROGRAM

## 2025-06-24 PROCEDURE — 99223 1ST HOSP IP/OBS HIGH 75: CPT | Performed by: HOSPITALIST

## 2025-06-24 PROCEDURE — 02RF08Z REPLACEMENT OF AORTIC VALVE WITH ZOOPLASTIC TISSUE, OPEN APPROACH: ICD-10-PCS | Performed by: THORACIC SURGERY (CARDIOTHORACIC VASCULAR SURGERY)

## 2025-06-24 PROCEDURE — 30233K1 TRANSFUSION OF NONAUTOLOGOUS FROZEN PLASMA INTO PERIPHERAL VEIN, PERCUTANEOUS APPROACH: ICD-10-PCS | Performed by: STUDENT IN AN ORGANIZED HEALTH CARE EDUCATION/TRAINING PROGRAM

## 2025-06-24 PROCEDURE — 02Q50ZZ REPAIR ATRIAL SEPTUM, OPEN APPROACH: ICD-10-PCS | Performed by: THORACIC SURGERY (CARDIOTHORACIC VASCULAR SURGERY)

## 2025-06-24 PROCEDURE — 71045 X-RAY EXAM CHEST 1 VIEW: CPT | Performed by: PHYSICIAN ASSISTANT

## 2025-06-24 PROCEDURE — 02RX0JZ REPLACEMENT OF THORACIC AORTA, ASCENDING/ARCH WITH SYNTHETIC SUBSTITUTE, OPEN APPROACH: ICD-10-PCS | Performed by: THORACIC SURGERY (CARDIOTHORACIC VASCULAR SURGERY)

## 2025-06-24 PROCEDURE — 30233M1 TRANSFUSION OF NONAUTOLOGOUS PLASMA CRYOPRECIPITATE INTO PERIPHERAL VEIN, PERCUTANEOUS APPROACH: ICD-10-PCS | Performed by: STUDENT IN AN ORGANIZED HEALTH CARE EDUCATION/TRAINING PROGRAM

## 2025-06-24 PROCEDURE — 93312 ECHO TRANSESOPHAGEAL: CPT | Performed by: STUDENT IN AN ORGANIZED HEALTH CARE EDUCATION/TRAINING PROGRAM

## 2025-06-24 PROCEDURE — 5A1221Z PERFORMANCE OF CARDIAC OUTPUT, CONTINUOUS: ICD-10-PCS | Performed by: THORACIC SURGERY (CARDIOTHORACIC VASCULAR SURGERY)

## 2025-06-24 PROCEDURE — 30233R1 TRANSFUSION OF NONAUTOLOGOUS PLATELETS INTO PERIPHERAL VEIN, PERCUTANEOUS APPROACH: ICD-10-PCS | Performed by: STUDENT IN AN ORGANIZED HEALTH CARE EDUCATION/TRAINING PROGRAM

## 2025-06-24 DEVICE — HEMASHIELD PLATINUM WOVEN STRAIGHT DOUBLE VELOUR VASCULAR GRAFT WITH GRAFT SIZER ACCESSORY
Type: IMPLANTABLE DEVICE | Site: AORTA | Status: FUNCTIONAL
Brand: HEMASHIELD

## 2025-06-24 DEVICE — INSPIRIS RESILIA AORTIC VALVE
Type: IMPLANTABLE DEVICE | Site: AORTA | Status: FUNCTIONAL
Brand: INSPIRIS RESILIA AORTIC VALVE

## 2025-06-24 DEVICE — BARD® PTFE FELT PLEDGETS, (OVAL), 4.8 MM X 6 MM
Type: IMPLANTABLE DEVICE | Status: FUNCTIONAL
Brand: BARD® PTFE FELT PLEDGETS

## 2025-06-24 DEVICE — BARD® PTFE FELT, 15.2 CM X 15.2 CM
Type: IMPLANTABLE DEVICE | Status: FUNCTIONAL
Brand: BARD® PTFE FELT

## 2025-06-24 RX ORDER — MORPHINE SULFATE 2 MG/ML
2 INJECTION, SOLUTION INTRAMUSCULAR; INTRAVENOUS EVERY 2 HOUR PRN
Status: DISCONTINUED | OUTPATIENT
Start: 2025-06-24 | End: 2025-06-28

## 2025-06-24 RX ORDER — ALBUTEROL SULFATE 90 UG/1
INHALANT RESPIRATORY (INHALATION) AS NEEDED
Status: DISCONTINUED | OUTPATIENT
Start: 2025-06-24 | End: 2025-06-24 | Stop reason: SURG

## 2025-06-24 RX ORDER — MAGNESIUM SULFATE HEPTAHYDRATE 40 MG/ML
2 INJECTION, SOLUTION INTRAVENOUS AS NEEDED
Status: DISCONTINUED | OUTPATIENT
Start: 2025-06-24 | End: 2025-06-26

## 2025-06-24 RX ORDER — HEPARIN SODIUM 1000 [USP'U]/ML
INJECTION, SOLUTION INTRAVENOUS; SUBCUTANEOUS AS NEEDED
Status: DISCONTINUED | OUTPATIENT
Start: 2025-06-24 | End: 2025-06-24 | Stop reason: SURG

## 2025-06-24 RX ORDER — NICOTINE POLACRILEX 4 MG
30 LOZENGE BUCCAL
Status: DISCONTINUED | OUTPATIENT
Start: 2025-06-24 | End: 2025-06-28

## 2025-06-24 RX ORDER — DEXMEDETOMIDINE HYDROCHLORIDE 4 UG/ML
INJECTION, SOLUTION INTRAVENOUS CONTINUOUS PRN
Status: DISCONTINUED | OUTPATIENT
Start: 2025-06-24 | End: 2025-06-24 | Stop reason: SURG

## 2025-06-24 RX ORDER — ASPIRIN 81 MG/1
81 TABLET ORAL DAILY
Status: DISCONTINUED | OUTPATIENT
Start: 2025-06-25 | End: 2025-06-28

## 2025-06-24 RX ORDER — SENNOSIDES 8.6 MG
8.6 TABLET ORAL 2 TIMES DAILY
Status: DISCONTINUED | OUTPATIENT
Start: 2025-06-25 | End: 2025-06-28

## 2025-06-24 RX ORDER — POLYETHYLENE GLYCOL 3350 17 G/17G
17 POWDER, FOR SOLUTION ORAL DAILY PRN
Status: DISCONTINUED | OUTPATIENT
Start: 2025-06-24 | End: 2025-06-28

## 2025-06-24 RX ORDER — ALBUMIN HUMAN 50 G/1000ML
12.5 SOLUTION INTRAVENOUS ONCE AS NEEDED
Status: DISPENSED | OUTPATIENT
Start: 2025-06-24 | End: 2025-06-25

## 2025-06-24 RX ORDER — ROSUVASTATIN CALCIUM 10 MG/1
10 TABLET, COATED ORAL NIGHTLY
Status: DISCONTINUED | OUTPATIENT
Start: 2025-06-24 | End: 2025-06-28

## 2025-06-24 RX ORDER — SODIUM CHLORIDE 9 MG/ML
INJECTION, SOLUTION INTRAVENOUS CONTINUOUS
Status: DISCONTINUED | OUTPATIENT
Start: 2025-06-24 | End: 2025-06-26

## 2025-06-24 RX ORDER — MORPHINE SULFATE 4 MG/ML
4 INJECTION, SOLUTION INTRAMUSCULAR; INTRAVENOUS EVERY 2 HOUR PRN
Status: DISCONTINUED | OUTPATIENT
Start: 2025-06-24 | End: 2025-06-28

## 2025-06-24 RX ORDER — SODIUM CHLORIDE 9 MG/ML
INJECTION, SOLUTION INTRAVENOUS CONTINUOUS PRN
Status: DISCONTINUED | OUTPATIENT
Start: 2025-06-24 | End: 2025-06-24 | Stop reason: SURG

## 2025-06-24 RX ORDER — MAGNESIUM SULFATE 1 G/100ML
1 INJECTION INTRAVENOUS AS NEEDED
Status: DISCONTINUED | OUTPATIENT
Start: 2025-06-24 | End: 2025-06-26

## 2025-06-24 RX ORDER — DIPHENHYDRAMINE HYDROCHLORIDE 50 MG/ML
INJECTION, SOLUTION INTRAMUSCULAR; INTRAVENOUS AS NEEDED
Status: DISCONTINUED | OUTPATIENT
Start: 2025-06-24 | End: 2025-06-24 | Stop reason: SURG

## 2025-06-24 RX ORDER — VANCOMYCIN HYDROCHLORIDE
15 EVERY 12 HOURS
Status: COMPLETED | OUTPATIENT
Start: 2025-06-24 | End: 2025-06-25

## 2025-06-24 RX ORDER — TAMSULOSIN HYDROCHLORIDE 0.4 MG/1
0.4 CAPSULE ORAL DAILY
Status: DISCONTINUED | OUTPATIENT
Start: 2025-06-25 | End: 2025-06-28

## 2025-06-24 RX ORDER — PANTOPRAZOLE SODIUM 40 MG/1
40 TABLET, DELAYED RELEASE ORAL
Status: DISCONTINUED | OUTPATIENT
Start: 2025-06-25 | End: 2025-06-28

## 2025-06-24 RX ORDER — MIDAZOLAM HYDROCHLORIDE 1 MG/ML
INJECTION INTRAMUSCULAR; INTRAVENOUS AS NEEDED
Status: DISCONTINUED | OUTPATIENT
Start: 2025-06-24 | End: 2025-06-24 | Stop reason: SURG

## 2025-06-24 RX ORDER — DEXTROSE MONOHYDRATE AND SODIUM CHLORIDE 5; .45 G/100ML; G/100ML
INJECTION, SOLUTION INTRAVENOUS CONTINUOUS
Status: DISCONTINUED | OUTPATIENT
Start: 2025-06-24 | End: 2025-06-26

## 2025-06-24 RX ORDER — DEXMEDETOMIDINE HYDROCHLORIDE 4 UG/ML
INJECTION, SOLUTION INTRAVENOUS CONTINUOUS
Status: DISCONTINUED | OUTPATIENT
Start: 2025-06-24 | End: 2025-06-25

## 2025-06-24 RX ORDER — POTASSIUM CHLORIDE 14.9 MG/ML
20 INJECTION INTRAVENOUS AS NEEDED
Status: DISCONTINUED | OUTPATIENT
Start: 2025-06-24 | End: 2025-06-26

## 2025-06-24 RX ORDER — NALOXONE HYDROCHLORIDE 0.4 MG/ML
0.08 INJECTION, SOLUTION INTRAMUSCULAR; INTRAVENOUS; SUBCUTANEOUS
Status: DISCONTINUED | OUTPATIENT
Start: 2025-06-24 | End: 2025-06-26

## 2025-06-24 RX ORDER — NITROGLYCERIN 20 MG/100ML
INJECTION INTRAVENOUS CONTINUOUS PRN
Status: DISCONTINUED | OUTPATIENT
Start: 2025-06-24 | End: 2025-06-26

## 2025-06-24 RX ORDER — POTASSIUM CHLORIDE 29.8 MG/ML
40 INJECTION INTRAVENOUS AS NEEDED
Status: DISCONTINUED | OUTPATIENT
Start: 2025-06-24 | End: 2025-06-26

## 2025-06-24 RX ORDER — DOBUTAMINE HYDROCHLORIDE 200 MG/100ML
INJECTION INTRAVENOUS CONTINUOUS PRN
Status: DISCONTINUED | OUTPATIENT
Start: 2025-06-24 | End: 2025-06-26

## 2025-06-24 RX ORDER — PROTAMINE SULFATE 10 MG/ML
INJECTION, SOLUTION INTRAVENOUS AS NEEDED
Status: DISCONTINUED | OUTPATIENT
Start: 2025-06-24 | End: 2025-06-24 | Stop reason: SURG

## 2025-06-24 RX ORDER — DEXAMETHASONE SODIUM PHOSPHATE 10 MG/ML
INJECTION, SOLUTION INTRAMUSCULAR; INTRAVENOUS AS NEEDED
Status: DISCONTINUED | OUTPATIENT
Start: 2025-06-24 | End: 2025-06-24 | Stop reason: SURG

## 2025-06-24 RX ORDER — CHLORHEXIDINE GLUCONATE ORAL RINSE 1.2 MG/ML
15 SOLUTION DENTAL
Status: DISCONTINUED | OUTPATIENT
Start: 2025-06-24 | End: 2025-06-25

## 2025-06-24 RX ORDER — BISACODYL 10 MG
10 SUPPOSITORY, RECTAL RECTAL
Status: DISCONTINUED | OUTPATIENT
Start: 2025-06-24 | End: 2025-06-28

## 2025-06-24 RX ORDER — DOBUTAMINE HYDROCHLORIDE 200 MG/100ML
INJECTION INTRAVENOUS CONTINUOUS PRN
Status: DISCONTINUED | OUTPATIENT
Start: 2025-06-24 | End: 2025-06-24 | Stop reason: SURG

## 2025-06-24 RX ORDER — DOCUSATE SODIUM 100 MG/1
100 CAPSULE, LIQUID FILLED ORAL 2 TIMES DAILY
Status: DISCONTINUED | OUTPATIENT
Start: 2025-06-25 | End: 2025-06-28

## 2025-06-24 RX ORDER — FAMOTIDINE 10 MG/ML
INJECTION, SOLUTION INTRAVENOUS AS NEEDED
Status: DISCONTINUED | OUTPATIENT
Start: 2025-06-24 | End: 2025-06-24 | Stop reason: SURG

## 2025-06-24 RX ORDER — ATORVASTATIN CALCIUM 10 MG/1
10 TABLET, FILM COATED ORAL NIGHTLY
Status: DISCONTINUED | OUTPATIENT
Start: 2025-06-24 | End: 2025-06-24

## 2025-06-24 RX ORDER — ROSUVASTATIN CALCIUM 20 MG/1
20 TABLET, COATED ORAL NIGHTLY
COMMUNITY

## 2025-06-24 RX ORDER — ONDANSETRON 2 MG/ML
4 INJECTION INTRAMUSCULAR; INTRAVENOUS EVERY 6 HOURS PRN
Status: DISCONTINUED | OUTPATIENT
Start: 2025-06-24 | End: 2025-06-28

## 2025-06-24 RX ORDER — CALCIUM CHLORIDE 100 MG/ML
INJECTION INTRAVENOUS; INTRAVENTRICULAR AS NEEDED
Status: DISCONTINUED | OUTPATIENT
Start: 2025-06-24 | End: 2025-06-24 | Stop reason: SURG

## 2025-06-24 RX ORDER — DIPHENHYDRAMINE HYDROCHLORIDE 50 MG/ML
12.5 INJECTION, SOLUTION INTRAMUSCULAR; INTRAVENOUS EVERY 4 HOURS PRN
Status: DISCONTINUED | OUTPATIENT
Start: 2025-06-24 | End: 2025-06-26

## 2025-06-24 RX ORDER — ROCURONIUM BROMIDE 10 MG/ML
INJECTION, SOLUTION INTRAVENOUS AS NEEDED
Status: DISCONTINUED | OUTPATIENT
Start: 2025-06-24 | End: 2025-06-24 | Stop reason: SURG

## 2025-06-24 RX ORDER — DEXTROSE MONOHYDRATE 25 G/50ML
50 INJECTION, SOLUTION INTRAVENOUS
Status: DISCONTINUED | OUTPATIENT
Start: 2025-06-24 | End: 2025-06-28

## 2025-06-24 RX ORDER — MIDAZOLAM HYDROCHLORIDE 1 MG/ML
1 INJECTION INTRAMUSCULAR; INTRAVENOUS EVERY 30 MIN PRN
Status: DISCONTINUED | OUTPATIENT
Start: 2025-06-24 | End: 2025-06-28

## 2025-06-24 RX ORDER — NICOTINE POLACRILEX 4 MG
15 LOZENGE BUCCAL
Status: DISCONTINUED | OUTPATIENT
Start: 2025-06-24 | End: 2025-06-28

## 2025-06-24 RX ORDER — ACETAMINOPHEN 10 MG/ML
1000 INJECTION, SOLUTION INTRAVENOUS EVERY 6 HOURS PRN
Status: DISPENSED | OUTPATIENT
Start: 2025-06-24 | End: 2025-06-25

## 2025-06-24 RX ADMIN — FAMOTIDINE 20 MG: 10 INJECTION, SOLUTION INTRAVENOUS at 07:19:00

## 2025-06-24 RX ADMIN — SODIUM CHLORIDE: 9 INJECTION, SOLUTION INTRAVENOUS at 08:20:00

## 2025-06-24 RX ADMIN — SODIUM CHLORIDE: 9 INJECTION, SOLUTION INTRAVENOUS at 13:16:00

## 2025-06-24 RX ADMIN — DEXAMETHASONE SODIUM PHOSPHATE 10 MG: 10 INJECTION, SOLUTION INTRAMUSCULAR; INTRAVENOUS at 07:19:00

## 2025-06-24 RX ADMIN — ALBUTEROL SULFATE 8 PUFF: 90 INHALANT RESPIRATORY (INHALATION) at 10:42:00

## 2025-06-24 RX ADMIN — SODIUM CHLORIDE: 9 INJECTION, SOLUTION INTRAVENOUS at 07:18:00

## 2025-06-24 RX ADMIN — DEXMEDETOMIDINE HYDROCHLORIDE 0.8 MCG/KG/HR: 4 INJECTION, SOLUTION INTRAVENOUS at 08:24:00

## 2025-06-24 RX ADMIN — MIDAZOLAM HYDROCHLORIDE 2 MG: 1 INJECTION INTRAMUSCULAR; INTRAVENOUS at 08:23:00

## 2025-06-24 RX ADMIN — DOBUTAMINE HYDROCHLORIDE 5 MCG/KG/MIN: 200 INJECTION INTRAVENOUS at 10:29:00

## 2025-06-24 RX ADMIN — MIDAZOLAM HYDROCHLORIDE 2 MG: 1 INJECTION INTRAMUSCULAR; INTRAVENOUS at 06:47:00

## 2025-06-24 RX ADMIN — DOBUTAMINE HYDROCHLORIDE 4 MCG/KG/MIN: 200 INJECTION INTRAVENOUS at 11:40:00

## 2025-06-24 RX ADMIN — HEPARIN SODIUM 26000 UNITS: 1000 INJECTION, SOLUTION INTRAVENOUS; SUBCUTANEOUS at 08:06:00

## 2025-06-24 RX ADMIN — MIDAZOLAM HYDROCHLORIDE 4 MG: 1 INJECTION INTRAMUSCULAR; INTRAVENOUS at 09:18:00

## 2025-06-24 RX ADMIN — MIDAZOLAM HYDROCHLORIDE 6 MG: 1 INJECTION INTRAMUSCULAR; INTRAVENOUS at 06:50:00

## 2025-06-24 RX ADMIN — PROTAMINE SULFATE 30 MG: 10 INJECTION, SOLUTION INTRAVENOUS at 10:47:00

## 2025-06-24 RX ADMIN — SODIUM CHLORIDE: 9 INJECTION, SOLUTION INTRAVENOUS at 13:01:00

## 2025-06-24 RX ADMIN — ROCURONIUM BROMIDE 100 MG: 10 INJECTION, SOLUTION INTRAVENOUS at 06:50:00

## 2025-06-24 RX ADMIN — CALCIUM CHLORIDE 0.25 G: 100 INJECTION INTRAVENOUS; INTRAVENTRICULAR at 10:49:00

## 2025-06-24 RX ADMIN — DOBUTAMINE HYDROCHLORIDE 2 MCG/KG/MIN: 200 INJECTION INTRAVENOUS at 12:17:00

## 2025-06-24 RX ADMIN — ROCURONIUM BROMIDE 50 MG: 10 INJECTION, SOLUTION INTRAVENOUS at 08:23:00

## 2025-06-24 RX ADMIN — DIPHENHYDRAMINE HYDROCHLORIDE 50 MG: 50 INJECTION, SOLUTION INTRAMUSCULAR; INTRAVENOUS at 07:19:00

## 2025-06-24 RX ADMIN — DOBUTAMINE HYDROCHLORIDE 3 MCG/KG/MIN: 200 INJECTION INTRAVENOUS at 11:41:00

## 2025-06-24 RX ADMIN — SODIUM CHLORIDE: 9 INJECTION, SOLUTION INTRAVENOUS at 12:19:00

## 2025-06-24 RX ADMIN — SODIUM CHLORIDE: 9 INJECTION, SOLUTION INTRAVENOUS at 06:45:00

## 2025-06-24 RX ADMIN — SODIUM CHLORIDE: 9 INJECTION, SOLUTION INTRAVENOUS at 07:32:00

## 2025-06-24 RX ADMIN — CALCIUM CHLORIDE 0.5 G: 100 INJECTION INTRAVENOUS; INTRAVENTRICULAR at 10:55:00

## 2025-06-24 RX ADMIN — CALCIUM CHLORIDE 0.25 G: 100 INJECTION INTRAVENOUS; INTRAVENTRICULAR at 12:45:00

## 2025-06-24 NOTE — ANESTHESIA PROCEDURE NOTES
Arterial Line    Performed by: Alex Rosenbaum MD  Authorized by: Alex Rosenbaum MD    General Information and Staff    Procedure Start:   Procedure End:  6/24/2025 6:59 AM  Anesthesiologist:  Alex Rosenbaum MD  Performed By:  Anesthesiologist  Patient Location:  OR  Indication: continuous blood pressure monitoring    Site Identification: real time ultrasound guided and image stored and retrievable    Preanesthetic Checklist: 2 patient identifiers, IV checked, risks and benefits discussed, monitors and equipment checked, pre-op evaluation, timeout performed, anesthesia consent and sterile technique used    Procedure Details    Catheter Size:  20 G  Catheter Length:  1 and 3/4 inch  Catheter Type:  Angiocath  Seldinger Technique?: No    Laterality:  Right  Site:  Radial artery  Site Prep: alcohol swabs    Line Secured:  Tape and Tegaderm    Assessment    Events: patient tolerated procedure well with no complications      Medications      Additional Comments

## 2025-06-24 NOTE — DISCHARGE INSTRUCTIONS
To access the Dr. Stern discharge instructions video on your home computer:   [email or web address]  Remove steri strips from all incisions on 7/8    [facility] @ discharge  [phone]

## 2025-06-24 NOTE — ANESTHESIA PROCEDURE NOTES
Central Line    Performed by: Alex Rosenbaum MD  Authorized by: Alex Rosenbaum MD    General Information and Staff    Procedure Start:   Procedure End:  6/24/2025 7:14 AM  Anesthesiologist:  Alex Rosenbaum MD  Performed by:  Anesthesiologist  Patient Location:  OR  Indication: central venous access and CVP monitoring    Site Identification: real time ultrasound guided and image stored and retrievable        Procedure Detail    Patient Position:  Trendelenburg  Laterality:  Right  Site:  Internal jugular  Prep:  Chloraprep  Catheter Size:  9 Fr  Catheter Type:  MAC introducer  Number of Lumens:  Double lumen  Procedure Detail: target vein identified, needle advanced into vein and blood aspirated and guidewire advanced into vein    Seldinger Technique?: Yes    Intravenous Verification: verified by ultrasound and venous blood return    Post Insertion: all ports aspirated, all ports flushed easily, guidewire was removed intact, line was sutured in place and dressing was applied      Assessment    Events: patient tolerated procedure well with no complications      PA Catheter Placement    PA Catheter Placed?: Yes    PA Catheter Type:  Oximetric  PA Catheter Size:  8  Laterality:  Right  Site:  Internal jugular  Placement Confirmation: pressure tracing changes and verified by YANELIS    PA Catheter Depth (cm):  48  Events: patient tolerated procedure well with no complications      Additional Comments

## 2025-06-24 NOTE — ANESTHESIA PREPROCEDURE EVALUATION
PRE-OP EVALUATION    Patient Name: Cain Davis    Admit Diagnosis: aortic stenosis, ascending aortic aneurysm, patent foramen ovale    Pre-op Diagnosis: aortic stenosis, ascending aortic aneurysm, patent foramen ovale    AORTIC VALVE REPLACEMENT WITH POSSIBLE ASCENDING AORTIC ANEURYSM REPLACEMENT, PATENT FORAMEN OVALE CLOSURE    Anesthesia Procedure: AORTIC VALVE REPLACEMENT WITH POSSIBLE ASCENDING AORTIC ANEURYSM REPLACEMENT, PATENT FORAMEN OVALE CLOSURE (Chest)    Surgeons and Role:     * Jonathon Stern MD - Primary    Pre-op vitals reviewed.  Temp: 97.4 °F (36.3 °C)  Pulse: 65  Resp: 16  BP: 128/67  SpO2: 95 %  Body mass index is 29.6 kg/m².    Current medications reviewed.  Hospital Medications:  Current Medications[1]    Outpatient Medications:   Prescriptions Prior to Admission[2]    Allergies: Patient has no known allergies.      Anesthesia Evaluation  Problem List[3]     Past Medical History[4]       Past Surgical History[5]  Social Hx on file[6]  History   Drug Use No     Available pre-op labs reviewed.  Lab Results   Component Value Date    WBC 3.9 (L) 06/13/2025    RBC 5.35 06/13/2025    HGB 15.9 06/13/2025    HCT 46.3 06/13/2025    MCV 86.5 06/13/2025    MCH 29.7 06/13/2025    MCHC 34.3 06/13/2025    RDW 13.1 06/13/2025    .0 06/13/2025     Lab Results   Component Value Date     06/13/2025    K 4.4 06/13/2025     06/13/2025    CO2 27.0 06/13/2025    BUN 12 06/13/2025    CREATSERUM 1.28 06/13/2025     (H) 06/13/2025    CA 9.6 06/13/2025     Lab Results   Component Value Date    INR 0.99 06/13/2025         Airway      Mallampati: I  Mouth opening: >3 FB  TM distance: 4 - 6 cm  Neck ROM: full Cardiovascular      Rhythm: regular  Rate: normal     Dental    Dentition appears grossly intact         Pulmonary    Pulmonary exam normal.  Breath sounds clear to auscultation bilaterally.               Other findings              ASA: 4   Plan: general  NPO status verified and      Post-procedure pain management plan discussed with surgeon and patient.    Comment: We discussed GA w/ETT and postoperative ventilation and CCU admission.  We discussed placement of additional lines including arterial catheter, additional PIVs, central venous catheter/PAC and intraop YANELIS.  Patient understands extubation will occur once the overall hemodynamic status is stable. Discussed rare risk of dental trauma from intubation, sore throat, and postop nausea and vomiting.  Discussed possible use of blood products. We discussed postoperative usage of sedatives, analgesics and anxiolytics.  All questions re: anesthetic management were answered.        Plan/risks discussed with: patient  Use of blood product(s) discussed with: patient    Consented to blood products.          Present on Admission:  **None**             [1]    mupirocin (Bactroban) 2% nasal ointment 1 Application  1 Application Nasal Once    [START ON 6/25/2025] ceFAZolin (Ancef) 2g in 10mL IV syringe premix  2 g Intravenous On Call    ceFAZolin Sodium 2 g in sodium chloride 0.9% 2,000 mL irrigation    PRN    gelatin adsorbable (Gelfoam) 100 external spone    PRN    heparin in lactated ringers ((Porcine)) 6500 UNITS - 1 L for CVOR procedural use    PRN    EPINEPHrine (Adrenalin) 5 mg in sodium chloride 0.9% 250 mL infusion  1-10 mcg/min Intravenous PRN    insulin regular human (Novolin R, Humulin R) 100 Units in sodium chloride 0.9% 100 mL standard infusion (100 mL)  1-40 Units/hr Intravenous PRN   [2]   Medications Prior to Admission   Medication Sig Dispense Refill Last Dose/Taking    CINNAMON OR Take 1 tablet by mouth in the morning.   Taking    Omega-3 Fatty Acids (OMEGA-3 FISH OIL OR) Take 1 tablet by mouth in the morning.   Taking    Ergocalciferol (VITAMIN D OR) Take by mouth in the morning.   Taking    TART CHERRY OR Take 1 tablet by mouth in the morning.   Taking    SERTRALINE HCL 50 MG Oral Tab TAKE 1 TABLET DAILY (Patient taking  differently: Take 1 tablet (50 mg total) by mouth daily.) 90 tablet 0 6/23/2025    TAMSULOSIN HCL 0.4 MG Oral Cap TAKE 1 CAPSULE DAILY (Patient taking differently: Take 1 capsule (0.4 mg total) by mouth daily.) 90 capsule 0 Taking Differently    atorvastatin 10 MG Oral Tab TAKE 1 TABLET BY MOUTH ONE TIME A DAY (Patient taking differently: Take 1 tablet (10 mg total) by mouth nightly. TAKE 1 TABLET BY MOUTH ONE TIME A DAY) 90 tablet 0 6/23/2025   [3]   Patient Active Problem List  Diagnosis    Mixed hyperlipidemia    Lumbar spondylosis    Disc degeneration, lumbar    Other wrist sprain and strain    Lateral epicondylitis  of elbow    Lumbar radiculitis    Benign non-nodular prostatic hyperplasia    Onychomycosis    Elevated bilirubin    History of hypertension    Generalized anxiety disorder   [4]   Past Medical History:   Essential hypertension    Heart valve disease    High blood pressure    High cholesterol    HYPERLIPIDEMIA    OTHER DISEASES    BENIGN HEART MURMUR   [5]   Past Surgical History:  Procedure Laterality Date    Fluor gid & loclzj ndl/cath spi dx/ther njx  6/29/2012    Procedure: LUMBAR EPIDURAL;  Surgeon: Nigel French MD;  Location: [facility]    Injection, w/wo contrast, dx/therapeutic substance, epidural/subarachnoid; lumbar/sacral  6/29/2012    Procedure: LUMBAR EPIDURAL;  Surgeon: Nigel French MD;  Location: [facility]    Other surgical history      SINUS SURG - 8YR AGO    Vasectomy     [6]   Social History  Socioeconomic History    Marital status:    Tobacco Use    Smoking status: Never    Smokeless tobacco: Never   Vaping Use    Vaping status: Never Used   Substance and Sexual Activity    Alcohol use: Yes     Comment: social    Drug use: No

## 2025-06-24 NOTE — HISTORICAL OFFICE NOTE
[facility]  [address] [place] [address]  [phone]      Cain Davis  Progress Note  Demographics:  Name: Cain Davis YOB: 1965  Age: 59, Male Medical Record No: [id number]  Visited Date/Time: 04/22/2025 09:40 AM    Chief Complaints  Hosp FU after YANELIS  History of Present Illness  59-year-old male presents to the office.  He was referred due to a dilated ascending thoracic aorta.  Patient denies any symptoms.  No chest pain.  No shortness of breath.  He has a history of prediabetes and hypertriglyceridemia.  He did have an echocardiogram performed in 2013 that showed bicuspid aortic valve with mild mitral valve regurgitation and a dilated aortic root.  Patient apparently has a son who was also recently diagnosed with a bicuspid aortic valve.    Today's visit  Patient returns the office following his transesophageal echocardiogram.  That showed moderate aortic valve regurgitation which is eccentric with fusion of the right and left coronary cusps.  Echocardiogram shows moderate bicuspid valve stenosis with moderate aortic valve regurgitation and aortic root dilatation of 4.6 cm  Cardiac risk factors Never smoked  Past Medical History  1.AA (aortic aneurysm)  2.Cardiac murmur  3.Hyperlipidemia  4.Generalized anxiety disorder  5.History of hypertension  6.Elevated bilirubin  7.Onychomycosis  8.Benign non-nodular prostatic hyperplasia  9.Lateral epicondylitis of elbow  10.Mixed hyperlipidemia  Family History  No significant family history noted.  Social History  Smoking status Never smoked  Tobacco usage - No (Non-smoker for personal reasons (finding))  Review of systems  Cardiovascular No history of Chest pain, GARG, Palpitations, Syncope, PND, Orthopnea, Edema and Claudication  Physical Examination  Vitals Right Arm Sitting  / 78 mmHg, Pulse rate 64 bpm, Height in 5' 7\", BMI: 30.4, Weight in 194 lbs (or) 88 kgs and BSA : 2.07 cc/m²  General  Appearance No Acute Distress and Appropriate  Cardiovascular   EKG/Other abnormalities  General: AAO x 3, no distress  EOMI: PERRLA, no conjunctiva  Resp: CTAB, no wheezing, coarse breath sounds  Cardiac: S1, S2, RRR, 3/6 systolic ejection murmur, no edema  GI: normal BS, soft, nontender  Ext: no edema, 2+ peripheral pulses  Neuro: no focal deficits  Allergies  No medication allergies noted.  Medications (Info obtained by: Verbal)  1.Cinnamon 500 mg capsule, 800mg daily  2.CoQ-10 100 mg capsule, Take 3 capsules orally once a day.  3.omega-3 300 mg-dha 120 mg-epa 180 mg-fish oil 1,000 mg capsule, Take 2 capsules orally once a day.  4.rosuvastatin 20 mg tablet, Take 1 tablet orally once a day.  5.SERTRALINE HCL 50 MG Oral Tab, TAKE 1 TABLET DAILY  6.TAMSULOSIN HCL 0.4 MG Oral Cap, TAKE 1 CAPSULE DAILY  7.Vitamin D3 50 mcg (2,000 unit) tablet, Take 1 tablet orally once a day.  Impression  1.Dilatation of thoracic aorta  2.Aortic root dilatation  3.Bicuspid aortic valve  Assessment & Plan  Bicuspid aortic valve  Dilated ascending thoracic aorta  Prediabetes  Hypertriglyceridemia      Total EKG shows normal sinus rhythm with nonspecific T wave abnormalities  Echocardiogram shows moderate aortic valve stenosis and moderate aortic valve regurgitation with an aortic root of 4.6 cm.  He also has mild mitral valve regurgitation  Transesophageal echocardiogram showed moderate eccentric aortic valve regurgitation with fusion of the right and left coronary cusp  Will obtain a CTA of his thoracic aorta  Referral to cardiothoracic surgery for evaluation aortic valve replacement as well as repair of his aorta.  Follow-up in 6 months  Labs and Diagnostics ordered  1.CTA Gated Thoracic Aorta ([facility]) (Schedule next available)  Future appointments  1.Follow up visit - Malika Henley MD (6 Months)  Miscellaneous  1.Weight monitoring (regime/therapy)  Nurses documentation  Upcoming surgeries: no  Use of assistive devices(s): no  Triage &  medication list reviewed by: AFSHIN  Refills completed: none  EKG: no  Patient instructions  Medications:   1. Continue current medications.    Testin. Schedule a CTA of thoracic aorta    Provider Follow Up:  1. Follow up with Dr. Henley in 6 months     Referring to Dr. Stern's office; phone number #[phone]    Please bring in you medication bottles or updated medicine list to your next appointment.  Call [facility] if you have any problems or concerns at [phone]      CPOE Orders carried out by: Nettie Turner  Care Providers: Ashlee Parry Osama Qaqi MD and Nettie Turner  Electronically Authenticated by  Malika Henley MD  2025 04:43:22 PM  Disclaimer: Components of this note were documented using voice recognition system and are subject to errors not corrected at proofreading. Contact the author of this note for any clarifications.

## 2025-06-24 NOTE — ANESTHESIA PROCEDURE NOTES
Airway  Date/Time: 6/24/2025 6:54 AM  Reason: elective      General Information and Staff   Patient location during procedure: OR  Anesthesiologist: Alex Rosenbaum MD  Performed: anesthesiologist   Performed by: Alex Rosenbaum MD  Authorized by: Alex Rosenbaum MD        Indications and Patient Condition  Indications for airway management: anesthesia  Sedation level: deep      Preoxygenated: yesPatient position: sniffing    Mask difficulty assessment: 0 - not attempted    Final Airway Details    Final airway type: endotracheal airway    Successful airway: ETT  Cuffed: yes   Successful intubation technique: direct laryngoscopy  Facilitating devices/methods: intubating stylet  Endotracheal tube insertion site: oral  Blade: Kylie  Blade size: #4  ETT size (mm): 8.0    Cormack-Lehane Classification: grade IIA - partial view of glottis  Placement verified by: capnometry   Measured from: teeth  ETT to teeth (cm): 23  Number of attempts at approach: 1    Additional Comments  Dentition unchanged after DL and intubation, atraumatic procedure.

## 2025-06-24 NOTE — ANESTHESIA PROCEDURE NOTES
Procedure Performed: YANELIS       Start Time:  6/24/2025 7:16 AM       End Time:      Preanesthesia Checklist:  Patient identified, IV assessed, risks and benefits discussed, monitors and equipment assessed, procedure being performed at surgeon's request and anesthesia consent obtained.    General Procedure Information  Diagnostic Indications for Echo:  assessment of ascending aorta, assessment of surgical repair and hemodynamic monitoring  Physician Requesting Echo: Jonathon Stern MD  Location performed:  OR  Intubated  Bite block placed  Heart visualized  Probe Insertion:  Easy  Probe Type:  Multiplane  Modalities:  2D only, color flow mapping, pulse wave Doppler, continuous wave Doppler and 3D        Anesthesia Information  Performed Personally  Anesthesiologist:  Alex Rosenbaum MD      Echocardiogram Comments:         Pre-CPB:    Dilated LV, low normal LV systolic function, LVEF estimated at 55% visually (65.5% by Steven's bi-plane, 60.5% by 3DEF, 55% by FAC). Dilated RV, normal RV systolic function (TAPSE = 18mm). The AV is bicuspid with fusion of the LCC and RCC, and diminuition of the NCC. There is severe AI (based on color doppler, CWD, and holosystolic flow reversal in proximal descending aorta). The MV annulus and leaflets are normal in appearance with no MR. Trace to mild TR with PA cath in place. There is a prominent PFO present with left to right flow. Mild to moderate aortic calcifications present in thoracic aorta.     Post    Ventricular FXN:  Global FXN: Unchanged   Regional FXN: Unchanged              Comments:   Post-CPB:    LVEF 65%, normal RV systolic function, on dobutamine gtt for inotropy. A bioprosthetic valve is present in the AV position, the valve is well-seated with no rocking motion, no paravalvular leak, normal central washing jet, no stenosis (peak gradient 8 mmHg, mean gradient 4 mmHg). Graft material is present in the proximal ascending aorta.The previously visualized PFO is now  closed with no residual shunting on color doppler. Aorta intact after decannulation. Otherwise unchanged heart structure and function.      Complications:None

## 2025-06-24 NOTE — DIETARY NOTE
Clinical Nutrition     Dietitian consult received per cardiac rehab standing order. Pt to be educated by cardiac rehab staff and encouraged to attend outpatient classes taught by URSULA. URSULA available PRN.    Cheri Wright, URSULA, LDN, [facility]  Clinical Dietitian  Spectra: [phone]

## 2025-06-24 NOTE — CONSULTS
[place] [facility]PITAL[facility]  CONSULT     Cain Davis Patient Status:  Inpatient    1965 MRN [id number]   Location [facility] 6NE-A Attending Jonathon Stern MD   Hosp Day # 0 PCP ZACH CHAVARRIA     Reason for consult: Medical management    Requested by: Dr Stern    Subjective:   History of Present Illness:     Cain Davis is a 59 year old male with Past medical history dilated ascending thoracic aorta, bicuspid aortic valve, hyperlipidemia, essential hypertension, anxiety presents hospital today for scheduled surgery.  Patient seen postoperatively, family was at the bedside.  Patient currently intubated and on ventilatory support.  Patient is slightly sedated.        History/Other:    Past Medical History:  Past Medical History[1]  Past Surgical History:   Past Surgical History[2]   Family History:   Family History[3]  Social History:    reports that he has never smoked. He has never used smokeless tobacco. He reports current alcohol use. He reports that he does not use drugs.     Allergies: Allergies[4]    Medications:  Medications Ordered Prior to Encounter[5]    Review of Systems:   A comprehensive review of systems was completed.    Pertinent positives and negatives noted in the HPI.    Objective:   Physical Exam:    /76   Pulse 69   Temp 97.6 °F (36.4 °C) (Pulmonary Artery)   Resp 16   Ht 5' 7\" (1.702 m)   Wt 189 lb (85.7 kg)   SpO2 100%   BMI 29.60 kg/m²   General: Sedated on the ventilator  Respiratory: Mechanical breath sounds present  Cardiovascular: S1, S2. Regular rate and rhythm  Abdomen: Soft, NT/ND, +BS  Neuro: No new focal deficits  Extremities: No edema      Results:    Labs:      Labs Last 24 Hours:  Recent Labs   Lab 25  1102 25  1342   WBC  --  4.5   HGB  --  10.6*   MCV  --  85.3   .0* 167.0   INR 1.53* <0.69*       Recent Labs   Lab 25  1342   *   BUN 14   CREATSERUM 1.09   CA 9.4      K 3.4*      CO2 26.0        Recent Labs   Lab 06/24/25  1102 06/24/25  1342   PTP 18.5* <10.0*   INR 1.53* <0.69*       No results for input(s): \"TROP\", \"CK\" in the last 168 hours.      Imaging: Imaging data reviewed in Epic.    Assessment & Plan:      #Dilated ascending thoracic aorta  #Bicuspid aortic valve  #Aortic stenosis  CVS primary  S/p AVR 25mm Inspiris bioprosthesis, replacement asc aorta, pfo clsure on 6/24  Insulin gtt  column A A1c 6.0 as of June 2025  ISS in sign and held  Cont ventilator support for now  Wean dobutamine gtt as tolerated     #Hyperlipidemia    #Essential hypertension    #Anxiety    #Benign nonnodular prostatic hyperplasia        Plan of care discussed with staff and patient    Yimi Hayawrd DO  6/24/2025    The 21st Century Cures Act makes medical notes like these available to patients in the interest of transparency. Please be advised this is a medical document. Medical documents are intended to carry relevant information, facts as evident, and the clinical opinion of the practitioner. The medical note is intended as peer to peer communication and may appear blunt or direct. It is written in medical language and may contain abbreviations or verbiage that are unfamiliar.            [1]   Past Medical History:   Essential hypertension    Heart valve disease    High blood pressure    High cholesterol    HYPERLIPIDEMIA    OTHER DISEASES    BENIGN HEART MURMUR   [2]   Past Surgical History:  Procedure Laterality Date    Fluor gid & loclzj ndl/cath spi dx/ther njx  6/29/2012    Procedure: LUMBAR EPIDURAL;  Surgeon: Nigel French MD;  Location: [facility]    Injection, w/wo contrast, dx/therapeutic substance, epidural/subarachnoid; lumbar/sacral  6/29/2012    Procedure: LUMBAR EPIDURAL;  Surgeon: Nigel French MD;  Location: [facility]    Other surgical history      SINUS SURG - 8YR AGO    Vasectomy     [3]   Family History  Problem Relation Age of Onset    Diabetes  Maternal Grandmother     Diabetes Maternal Grandfather     Lipids Brother     Hypertension Brother     Hypertension Father     Cancer Father         SKIN CA   [4] No Known Allergies  [5]   Current Facility-Administered Medications on File Prior to Encounter   Medication Dose Route Frequency Provider Last Rate Last Admin    [COMPLETED] sodium chloride 0.9% infusion   Intravenous On Call Aakash Moore DO   Stopped at 04/11/25 1038     Current Outpatient Medications on File Prior to Encounter   Medication Sig Dispense Refill    rosuvastatin 20 MG Oral Tab Take 1 tablet (20 mg total) by mouth nightly.      CINNAMON OR Take 1 tablet by mouth in the morning.      Omega-3 Fatty Acids (OMEGA-3 FISH OIL OR) Take 1 tablet by mouth in the morning.      Ergocalciferol (VITAMIN D OR) Take by mouth in the morning.      TART CHERRY OR Take 1 tablet by mouth in the morning.      SERTRALINE HCL 50 MG Oral Tab TAKE 1 TABLET DAILY (Patient taking differently: Take 1 tablet (50 mg total) by mouth daily.) 90 tablet 0    TAMSULOSIN HCL 0.4 MG Oral Cap TAKE 1 CAPSULE DAILY (Patient taking differently: Take 1 capsule (0.4 mg total) by mouth daily.) 90 capsule 0    atorvastatin 10 MG Oral Tab TAKE 1 TABLET BY MOUTH ONE TIME A DAY (Patient taking differently: Take 1 tablet (10 mg total) by mouth nightly. TAKE 1 TABLET BY MOUTH ONE TIME A DAY) 90 tablet 0

## 2025-06-24 NOTE — ANESTHESIA POSTPROCEDURE EVALUATION
[facility]    Cain Davis Patient Status:  Inpatient   Age/Gender 59 year old male MRN [id number]   Location [facility] 6NE-A Attending Jonathon Stern MD   Hosp Day # 0 PCP ZACH CHAVARRIA       Anesthesia Post-op Note    AORTIC VALVE REPLACEMENT USING 25 MM INSPIRIS RESILIA; WITH ASCENDING AORTIC ANEURYSM REPLACEMENT USING 28MM HEMASHIELD PLATINUM WOVEN DOUBLE VELOUR VASCULAR GRAFT, PATENT FORAMEN OVALE CLOSURE    Procedure Summary       Date: 06/24/25 Room / Location:  [facility] 02 /  [facility]    Anesthesia Start: 0645 Anesthesia Stop: 1339    Procedure: AORTIC VALVE REPLACEMENT USING 25 MM INSPIRIS RESILIA; WITH ASCENDING AORTIC ANEURYSM REPLACEMENT USING 28MM HEMASHIELD PLATINUM WOVEN DOUBLE VELOUR VASCULAR GRAFT, PATENT FORAMEN OVALE CLOSURE (Chest) Diagnosis: (aortic stenosis, ascending aortic aneurysm, patent foramen ovale)    Surgeons: Jonathon Stern MD Anesthesiologist: Alex Rosenbaum MD    Anesthesia Type: general ASA Status: 4            Anesthesia Type: general    Vitals Value Taken Time   /79 06/24/25 13:45   Temp 96.3 °F (35.7 °C) 06/24/25 13:45   Pulse 88 06/24/25 13:45   Resp 16 06/24/25 13:45   SpO2 100 % 06/24/25 13:45   Vitals shown include unfiled device data.        Patient Location: ICU    Anesthesia Type: general    Airway Patency: patent and intubated    Postop Pain Control: sedated until time of extubation    Mental Status: sedated until time of extubation    Nausea/Vomiting: none    Cardiopulmonary/Hydration status: stable euvolemic    Complications: no apparent anesthesia related complications    Postop vital signs: stable    Dental Exam: Unchanged from Preop    Sign out given to ICU staff.

## 2025-06-24 NOTE — CONSULTS
[facility]  Report of Consultation    Cain Davis Patient Status:  Inpatient    1965 MRN [id number]   Location [facility] 6NE-A Attending Jonathon Stern MD   Hosp Day # 0 PCP ZACH CHAVARRIA     Reason for Consultation:  Cardiac management s/p AVR 25 mm Inspiris bioprosthesis, replacement asc aorta, pfo closure     History of Present Illness:  Cain Davis is a a(n) 59 year old male followed by Dr Henley with hx of a bicuspid aortic valve with moderate stenosis and at least moderate AI, dilated ascending thoracic aorta  measuring 4.1 cm and a small PFO noted on YANELIS who presented electively for staged surgical correction.     Pt also with pmhx htn, hyperlipidemia as well as anxiety disorder    History:  Past Medical History[1]  Past Surgical History[2]  Family History[3]   reports that he has never smoked. He has never used smokeless tobacco. He reports current alcohol use. He reports that he does not use drugs.    Allergies:  Allergies[4]    Medications:  Current Hospital Medications[5]    Review of Systems:  All systems were reviewed and are negative except as described above in HPI.    Physical Exam:  Blood pressure 96/61, pulse 88, temperature (!) 96.4 °F (35.8 °C), temperature source Pulmonary Artery, resp. rate 16, height 5' 7\" (1.702 m), weight 189 lb (85.7 kg), SpO2 99%.  Temp (24hrs), Av.8 °F (36 °C), Min:96.3 °F (35.7 °C), Max:97.5 °F (36.4 °C)    Wt Readings from Last 3 Encounters:   25 189 lb (85.7 kg)   25 180 lb (81.6 kg)   19 185 lb (83.9 kg)       Telemetry: Sinus rhythm   General: Intubated sedated  HEENT: ETT in place  Neck: No JVD, RIJ [place]  Cardiac: Regular rate and rhythm, S1, S2 normal, no murmur, rub or gallop.  Lungs: Clear anteriorly on vent  Abdomen: Soft  Extremities: Without clubbing, cyanosis or edema.  Peripheral pulses are 2+.  Neurologic: Sedated  Skin: Warm and dry.     Laboratories and Data:  Diagnostics:      YANELIS  4/11/25  Normal LV function with an EF of 60 to 65% with wall motion abnormalities  Functionally bicuspid aortic valve with fusion of the right and left coronary cusps.  There is associated at least moderate regurgitation which is very eccentric and anteriorly directed.  VCM space 0.44 cm.  There is likely prolapse of diffuse coronary cusps.  Aortic root dilatation noted.  Estimated 4.0 cm  No intracardiac mass or thrombus in the left atrial appendage  No evidence of flow reversal in the pulmonary veins  Color Doppler flow interrogation of the intra atrial septum with left-to-right intracardiac shunting to suggest PFO.  Interatrial septum with aneurysmal appearance  No significant scattered at the    Echo: 3/21/25  1. Left ventricle: The cavity size was normal. Wall thickness was normal.      Systolic function was normal. The estimated ejection fraction was 60-65%,      by visual assessment. No diagnostic evidence for regional wall motion      abnormalities. Left ventricular diastolic function parameters were      normal.   2. Aortic valve: The valve was bicuspid. The leaflets were mildly thickened      and mildly to moderately calcified. Transvalvular velocity was increased.      The findings were consistent with moderate stenosis. There was moderate      regurgitation directed eccentrically in the LVOT and towards the mitral      anterior leaflet. The peak systolic velocity was 3.08m/sec. The mean      systolic gradient was 22mm Hg.   3. Aortic root: The aortic root was dilated and 4.6cm diameter.   4. Ascending aorta: The ascending aorta was dilated and 4.1cm diameter.   5. Mitral valve: The annulus was moderately calcified. There was mild      regurgitation.       CCTA 3/16/2025  No identifiable calcified plaque in any arteries      CXR:   Pending    Labs:   Lab Results   Component Value Date    WBC 4.5 06/24/2025    RBC 3.61 06/24/2025    HGB 10.6 06/24/2025    HCT 30.8 06/24/2025    MCV 85.3 06/24/2025    MCH  29.4 06/24/2025    MCHC 34.4 06/24/2025    RDW 12.8 06/24/2025    .0 06/24/2025     Lab Results   Component Value Date    INR 1.53 (H) 06/24/2025    INR 0.99 06/13/2025       Assessment/Plan:  Problem List[6]    Bicuspid aortic valve with moderate aortic stenosis, mild aortic insufficiency with ascending aortic aneurysm and dilated aortic root, PFO status post aortic valve replacement with a 25 mm Inspiris bioprosthesis, replacement ascending aorta, closure PFO- POD 0- ef 65%  Hemodynamically stable on current inotropic support (dobutamine 1 mcg/kg/min),   CO/CI 5.0/2.6    PAP 20/14 SpO2 82%  EKG sinus rhythm without acute ischemic changes or injury pattern  Good uop, cr 1.09    CT OP 10.6, plts 167k  Cxr pending   Htn-  IV nitroglycerin as needed to maintain BP parameters as outlined by CV surgery  HL-historically on Crestor 10 mg daily    Plan:    Routine postop CCU recovery  Wean dobutamine as able  IV nitroglycerin as needed to maintain BP parameters as outlined by CV surgery  Will review formal chest x-ray report when available  Plan to wean to extubation later this evening pending clinical course    MOMO Black  6/24/2025  2:28 PM      Attending attestation ( This is a late attestation for patient seen on 6/24/25 at 5pm)  I have personally seen and examined patient.   I have independently formulated assessment and plan  I agree with the AP note    Intubated, sedated  POD #0 s/p AVR 25 mm Inspiris bioprosthesis, replacement asc aorta, pfo closure   LVEF preserved  Hypertension  Hyperlipidemia    Routine post op care.   Plan for likely extubation tonight  Titrate pressers as per CTS    Will cont to follow    Thank you for the opportunity to participate in the care of your patient.     Brenda Melvin MD  Interventional Cardiologist  [facility]         [1]   Past Medical History:   Essential hypertension    Heart valve disease    High blood pressure    High cholesterol     HYPERLIPIDEMIA    OTHER DISEASES    BENIGN HEART MURMUR   [2]   Past Surgical History:  Procedure Laterality Date    Fluor gid & loclzj ndl/cath spi dx/ther njx  6/29/2012    Procedure: LUMBAR EPIDURAL;  Surgeon: Nigel French MD;  Location: [facility]    Injection, w/wo contrast, dx/therapeutic substance, epidural/subarachnoid; lumbar/sacral  6/29/2012    Procedure: LUMBAR EPIDURAL;  Surgeon: Nigel French MD;  Location: [facility]    Other surgical history      SINUS SURG - 8YR AGO    Vasectomy     [3]   Family History  Problem Relation Age of Onset    Diabetes Maternal Grandmother     Diabetes Maternal Grandfather     Lipids Brother     Hypertension Brother     Hypertension Father     Cancer Father         SKIN CA   [4] No Known Allergies  [5]   Current Facility-Administered Medications:     midazolam (Versed) 2 MG/2ML injection 1 mg, 1 mg, Intravenous, Q30 Min PRN    propofol (Diprivan) 10 mg/mL infusion premix, 5-50 mcg/kg/min (Dosing Weight), Intravenous, Continuous    chlorhexidine gluconate (Peridex) 0.12 % oral solution 15 mL, 15 mL, Mouth/Throat, BID@0800,2000    dexmedeTOMIDine in sodium chloride 0.9% (Precedex) 400 mcg/100mL infusion premix, 0.2-1.5 mcg/kg/hr (Dosing Weight), Intravenous, Continuous    glucose (Dex4) 15 GM/59ML oral liquid 15 g, 15 g, Oral, Q15 Min PRN **OR** glucose (Glutose) 40% oral gel 15 g, 15 g, Oral, Q15 Min PRN **OR** glucose-vitamin C (Dex-4) chewable tab 4 tablet, 4 tablet, Oral, Q15 Min PRN **OR** dextrose 50% injection 50 mL, 50 mL, Intravenous, Q15 Min PRN **OR** glucose (Dex4) 15 GM/59ML oral liquid 30 g, 30 g, Oral, Q15 Min PRN **OR** glucose (Glutose) 40% oral gel 30 g, 30 g, Oral, Q15 Min PRN **OR** glucose-vitamin C (Dex-4) chewable tab 8 tablet, 8 tablet, Oral, Q15 Min PRN    insulin regular human (Novolin R, Humulin R) 100 Units in sodium chloride 0.9% 100 mL standard infusion (100 mL), 1-57 Units/hr, Intravenous,  Continuous    atorvastatin (Lipitor) tab 10 mg, 10 mg, Oral, Nightly    [START ON 6/25/2025] sertraline (Zoloft) tab 50 mg, 50 mg, Oral, Daily    [START ON 6/25/2025] tamsulosin (Flomax) cap 0.4 mg, 0.4 mg, Oral, Daily    sodium chloride 0.9% infusion, , Intravenous, Continuous    acetaminophen (Ofirmev) 10 mg/mL infusion premix 1,000 mg, 1,000 mg, Intravenous, Q6H PRN    melatonin tab 3 mg, 3 mg, Oral, Nightly PRN    [START ON 6/25/2025] sennosides (Senokot) tab 8.6 mg, 8.6 mg, Oral, BID    [START ON 6/25/2025] docusate sodium (Colace) cap 100 mg, 100 mg, Oral, BID    polyethylene glycol (PEG 3350) (Miralax) 17 g oral packet 17 g, 17 g, Oral, Daily PRN    bisacodyl (Dulcolax) 10 MG rectal suppository 10 mg, 10 mg, Rectal, Daily PRN    ondansetron (Zofran) 4 MG/2ML injection 4 mg, 4 mg, Intravenous, Q6H PRN    vancomycin (Vancocin) 1.25 g in sodium chloride 0.9% 250mL IVPB premix, 15 mg/kg, Intravenous, Q12H    albumin human (Albumin) 5% injection 12.5 g, 12.5 g, Intravenous, Once PRN    DOBUTamine in dextrose 5% (Dobutrex) 500 mg/250mL infusion premix, 2.5-20 mcg/kg/min (Dosing Weight), Intravenous, Continuous PRN    nitroGLYCERIN in dextrose 5% 50 mg/250mL infusion premix, 5-300 mcg/min, Intravenous, Continuous PRN    norepinephrine (Levophed) 4 mg/250mL infusion premix, 0.5-30 mcg/min, Intravenous, Continuous PRN    NitroPRUSSide (Nipride) 50 mg in dextrose 5% 250 mL infusion, 0.1-4 mcg/kg/min (Dosing Weight), Intravenous, Continuous PRN    [START ON 6/25/2025] metoprolol tartrate (Lopressor) partial tab 12.5 mg, 12.5 mg, Oral, 2x Daily(Beta Blocker)    potassium chloride 20 mEq/100mL IVPB premix 20 mEq, 20 mEq, Intravenous, PRN **OR** potassium chloride 40 mEq/100mL IVPB premix (central line) 40 mEq, 40 mEq, Intravenous, PRN    calcium gluconate 3 g in sodium chloride 0.9% 100 mL IVPB, 3 g, Intravenous, PRN    magnesium sulfate in dextrose 5% 1 g/100mL infusion premix 1 g, 1 g, Intravenous, PRN    magnesium  sulfate in sterile water for injection 2 g/50mL IVPB premix 2 g, 2 g, Intravenous, PRN    mupirocin (Bactroban) 2% nasal ointment 1 Application, 1 Application, Nasal, BID    dextrose 5%-sodium chloride 0.45% infusion,  mL/hr, Intravenous, Continuous    morphINE PF 2 MG/ML injection 2 mg, 2 mg, Intravenous, Q2H PRN **OR** morphINE PF 4 MG/ML injection 4 mg, 4 mg, Intravenous, Q2H PRN    [START ON 6/25/2025] aspirin DR tab 81 mg, 81 mg, Oral, Daily    [START ON 6/25/2025] pantoprazole (Protonix) 40 mg in sodium chloride 0.9% PF 10 mL IV push, 40 mg, Intravenous, QAM AC **OR** [START ON 6/25/2025] pantoprazole (Protonix) DR tab 40 mg, 40 mg, Oral, QAM AC    ceFAZolin (Ancef) 2g in 10mL IV syringe premix, 2 g, Intravenous, Q8H    sodium chloride 0.9% infusion, , Intravenous, Continuous    HYDROmorphone in sodium chloride 0.9% (Dilaudid) 20 mg/100mL PCA premix, , Intravenous, Continuous    naloxone (Narcan) 0.4 MG/ML injection 0.08 mg, 0.08 mg, Intravenous, Q5 Min PRN    diphenhydrAMINE (Benadryl) 50 mg/mL  injection 12.5 mg, 12.5 mg, Intravenous, Q4H PRN  [6]   Patient Active Problem List  Diagnosis    Mixed hyperlipidemia    Lumbar spondylosis    Disc degeneration, lumbar    Other wrist sprain and strain    Lateral epicondylitis  of elbow    Lumbar radiculitis    Benign non-nodular prostatic hyperplasia    Onychomycosis    Elevated bilirubin    History of hypertension    Generalized anxiety disorder

## 2025-06-25 ENCOUNTER — APPOINTMENT (OUTPATIENT)
Dept: GENERAL RADIOLOGY | Facility: HOSPITAL | Age: 60
End: 2025-06-25
Attending: PHYSICIAN ASSISTANT
Payer: COMMERCIAL

## 2025-06-25 ENCOUNTER — APPOINTMENT (OUTPATIENT)
Dept: GENERAL RADIOLOGY | Facility: HOSPITAL | Age: 60
End: 2025-06-25
Attending: THORACIC SURGERY (CARDIOTHORACIC VASCULAR SURGERY)
Payer: COMMERCIAL

## 2025-06-25 LAB
ATRIAL RATE: 72 BPM
BASOPHILS # BLD AUTO: 0 X10(3) UL (ref 0–0.2)
BASOPHILS NFR BLD AUTO: 0 %
BLOOD TYPE BARCODE: 5100
BLOOD TYPE BARCODE: 7300
BLOOD TYPE BARCODE: 7300
BUN BLD-MCNC: 12 MG/DL (ref 9–23)
CALCIUM BLD-MCNC: 8.6 MG/DL (ref 8.7–10.6)
CHLORIDE SERPL-SCNC: 109 MMOL/L (ref 98–112)
CO2 SERPL-SCNC: 25 MMOL/L (ref 21–32)
CREAT BLD-MCNC: 1.08 MG/DL (ref 0.7–1.3)
EGFRCR SERPLBLD CKD-EPI 2021: 80 ML/MIN/1.73M2 (ref 60–?)
EOSINOPHIL # BLD AUTO: 0 X10(3) UL (ref 0–0.7)
EOSINOPHIL NFR BLD AUTO: 0 %
ERYTHROCYTE [DISTWIDTH] IN BLOOD BY AUTOMATED COUNT: 13.2 %
GLUCOSE BLD-MCNC: 108 MG/DL (ref 70–99)
GLUCOSE BLD-MCNC: 116 MG/DL (ref 70–99)
GLUCOSE BLD-MCNC: 120 MG/DL (ref 70–99)
GLUCOSE BLD-MCNC: 121 MG/DL (ref 70–99)
GLUCOSE BLD-MCNC: 123 MG/DL (ref 70–99)
GLUCOSE BLD-MCNC: 130 MG/DL (ref 70–99)
GLUCOSE BLD-MCNC: 132 MG/DL (ref 70–99)
GLUCOSE BLD-MCNC: 134 MG/DL (ref 70–99)
GLUCOSE BLD-MCNC: 138 MG/DL (ref 70–99)
GLUCOSE BLD-MCNC: 145 MG/DL (ref 70–99)
GLUCOSE BLD-MCNC: 149 MG/DL (ref 70–99)
GLUCOSE BLD-MCNC: 161 MG/DL (ref 70–99)
HCT VFR BLD AUTO: 28.1 % (ref 39–53)
HGB BLD-MCNC: 9.6 G/DL (ref 13–17.5)
IMM GRANULOCYTES # BLD AUTO: 0.03 X10(3) UL (ref 0–1)
IMM GRANULOCYTES NFR BLD: 0.4 %
ISTAT ACTIVATED CLOTTING TIME: 106 SECONDS (ref 74–137)
LYMPHOCYTES # BLD AUTO: 0.39 X10(3) UL (ref 1–4)
LYMPHOCYTES NFR BLD AUTO: 4.9 %
MAGNESIUM SERPL-MCNC: 1.8 MG/DL (ref 1.6–2.6)
MCH RBC QN AUTO: 29.4 PG (ref 26–34)
MCHC RBC AUTO-ENTMCNC: 34.2 G/DL (ref 31–37)
MCV RBC AUTO: 85.9 FL (ref 80–100)
MONOCYTES # BLD AUTO: 0.65 X10(3) UL (ref 0.1–1)
MONOCYTES NFR BLD AUTO: 8.2 %
NEUTROPHILS # BLD AUTO: 6.81 X10 (3) UL (ref 1.5–7.7)
NEUTROPHILS # BLD AUTO: 6.81 X10(3) UL (ref 1.5–7.7)
NEUTROPHILS NFR BLD AUTO: 86.5 %
P AXIS: 28 DEGREES
P-R INTERVAL: 140 MS
PLATELET # BLD AUTO: 165 10(3)UL (ref 150–450)
POTASSIUM SERPL-SCNC: 3.7 MMOL/L (ref 3.5–5.1)
POTASSIUM SERPL-SCNC: 3.7 MMOL/L (ref 3.5–5.1)
Q-T INTERVAL: 432 MS
QRS DURATION: 122 MS
QTC CALCULATION (BEZET): 473 MS
R AXIS: 22 DEGREES
RBC # BLD AUTO: 3.27 X10(6)UL (ref 4.3–5.7)
SODIUM SERPL-SCNC: 144 MMOL/L (ref 136–145)
T AXIS: 9 DEGREES
UNIT VOLUME: 103 ML
UNIT VOLUME: 207 ML
UNIT VOLUME: 312 ML
UNIT VOLUME: 338 ML
VENTRICULAR RATE: 72 BPM
WBC # BLD AUTO: 7.9 X10(3) UL (ref 4–11)

## 2025-06-25 PROCEDURE — 71045 X-RAY EXAM CHEST 1 VIEW: CPT | Performed by: PHYSICIAN ASSISTANT

## 2025-06-25 PROCEDURE — 99232 SBSQ HOSP IP/OBS MODERATE 35: CPT | Performed by: HOSPITALIST

## 2025-06-25 PROCEDURE — 71045 X-RAY EXAM CHEST 1 VIEW: CPT | Performed by: THORACIC SURGERY (CARDIOTHORACIC VASCULAR SURGERY)

## 2025-06-25 RX ORDER — HYDROCODONE BITARTRATE AND ACETAMINOPHEN 5; 325 MG/1; MG/1
2 TABLET ORAL EVERY 4 HOURS PRN
Refills: 0 | Status: DISCONTINUED | OUTPATIENT
Start: 2025-06-25 | End: 2025-06-28

## 2025-06-25 RX ORDER — HYDROCODONE BITARTRATE AND ACETAMINOPHEN 5; 325 MG/1; MG/1
1 TABLET ORAL EVERY 4 HOURS PRN
Refills: 0 | Status: DISCONTINUED | OUTPATIENT
Start: 2025-06-25 | End: 2025-06-28

## 2025-06-25 NOTE — PROGRESS NOTES
[facility]   part of [facility]     CV Surgery Progress Note    Cain Davis Patient Status:  Inpatient    1965 MRN [id number]   Location [facility] 6NE-A Attending Jonathon Stern MD   Hosp Day # 1 PCP ZACH CHAVARRIA     Subjective:  Patient drowsy, reports feeling ok. Pain is controlled. Denies any nausea or dizziness.     Tele: SR    Objective:  BP 99/59   Pulse 70   Temp 98.3 °F (36.8 °C) (Temporal)   Resp 15   Ht 5' 7\" (1.702 m)   Wt 199 lb 12.8 oz (90.6 kg)   SpO2 98%   BMI 31.29 kg/m²     Intake/Output:    Intake/Output Summary (Last 24 hours) at 2025 0833  Last data filed at 2025 0728  Gross per 24 hour   Intake 5173.75 ml   Output 4630 ml   Net 543.75 ml       Labs:  Lab Results   Component Value Date    WBC 7.9 2025    RBC 3.27 2025    HGB 9.6 2025    HCT 28.1 2025    MCV 85.9 2025    MCH 29.4 2025    MCHC 34.2 2025    RDW 13.2 2025    .0 2025     Lab Results   Component Value Date     2025    K 3.7 2025    K 3.7 2025     2025    CO2 25.0 2025    BUN 12 2025    CREATSERUM 1.08 2025     2025    CA 8.6 2025     Lab Results   Component Value Date    INR <0.69 (L) 2025    INR 1.53 (H) 2025    INR 0.99 2025       Studies:  CXR:   Left basilar atelectasis    Physical Exam:  General: VSS, A&Ox3, In NAD  Neck: No JVD. [place] in RIJ  Lungs: clear anteriorly   Heart: S1,S2 RRR. Soft raymundo. Sternum Stable   Abdomen: Soft, non-tender, +bs  Extremities: Warm, dry, trace generalized edema  Skin: sternotomy incision C/D/I  Neuro: no focal deficits     Assessment/Plan:   S/P AVR with a 25mm Inspiris bovine pericardial valve, ascending aorta replacement with a 28mm Hemashield graft and closure of PFO POD#1  -HD stable, off support, de-line   -Low grade temp, wbc normal, likely reactive- monitor   -Acute post op blood  loss anemia, expected- monitor   -Vol OL, eventual diuresis   -Renal function intact, good UOP  -Bowel regimen   -Pain management, dilaudid pca/convert to po   -Discontinue chest tubes   -Keep PW for now   -GI PPX: protonix  -DVT PPX: TEDs/SCDs  -Encourage IS/ambulation   -PT/OT/Cardiac rehab   -CCU monitoring     -D/W Dr. Leena Reece PA-C  Cardiothoracic Surgery   6/25/2025  8:33 AM

## 2025-06-25 NOTE — CM/SW NOTE
Patient s/p AVR with Inspiris bovine pericardial valve, replacement of ascending aorta Hemashield graft and repair of PFO on 6/24/2025 by Dr Stern.  [facility] arranged prior to surgery.  [facility]--[facility] in [facility] @ discharge  [phone]

## 2025-06-25 NOTE — OCCUPATIONAL THERAPY NOTE
OCCUPATIONAL THERAPY EVALUATION - INPATIENT     Room Number: 6604/6604-A  Evaluation Date: 6/25/2025  Type of Evaluation: Initial  Presenting Problem: 6/24 AVR    Physician Order: IP Consult to Occupational Therapy  Reason for Therapy: ADL/IADL Dysfunction and Discharge Planning    OCCUPATIONAL THERAPY ASSESSMENT   Patient is currently functioning below baseline with toileting, bathing, lower body dressing, bed mobility, transfers, and energy conservation strategies. Prior to admission, patient's baseline is independent.  Patient is requiring moderate assistance as a result of the following impairments: decreased functional strength, decreased functional reach, decreased endurance, pain, and medical status. Occupational Therapy will continue to follow for duration of hospitalization.    Patient will benefit from continued skilled OT Services during admission; likely with no additional skilled services but increased support at home    History Related to Current Admission: Patient is a 59 year old male admitted on 6/24/2025 with Presenting Problem: 6/24 AVR. Co-Morbidities : hyperlipidemia    WEIGHT BEARING RESTRICTION       Recommendations for nursing staff:   Transfers: x1 with walker  Toileting location: bathroom    EVALUATION SESSION:  Patient Start of Session: chair    FUNCTIONAL TRANSFER ASSESSMENT  Sit to Stand: Chair  Chair: Contact Guard Assist  Toilet Transfer: Contact Guard Assist    BED MOBILITY  Sit to Supine (OT): Minimal Assist    BALANCE ASSESSMENT     FUNCTIONAL ADL ASSESSMENT  Grooming Standing: Contact Guard Assist  LB Dressing Seated: Moderate Assist  Toileting Seated: Stand-by Assist    ACTIVITY TOLERANCE: fair, limited by fear of pain                         O2 SATURATIONS       COGNITION  Overall Cognitive Status:  WFL - within functional limits    Upper Extremity   ROM: within functional limits   Strength: within functional limits   Coordination  Gross motor: intact  Fine motor:  intact  Sensation: Light touch:  intact    EDUCATION PROVIDED  Patient Education : Role of Occupational Therapy; Plan of Care; Discharge Recommendations; Functional Transfer Techniques; Fall Prevention; Posture/Positioning; Surgical Precautions; Edema Reduction; Energy Conservation; Proper Body Mechanics  Patient's Response to Education: Verbalized Understanding    Equipment used: walker  Demonstrates functional use, Would benefit from additional trial      Therapist comments: Pt educated on sternal precautions and functional application to bed mobility, transfers, and ADLs. Pt verbalized understanding. CGA sit to stand tx with cues for hand placement. Pt ambulates out into hallway CGA with walker, back into room and into bathroom. CGA toilet tx, SBA toileting and mod LB dressing.     Patient End of Session: In bed, Needs met, Call light within reach, RN aware of session/findings, Hospital anti-slip socks, All patient questions and concerns addressed, Alarm set    OCCUPATIONAL PROFILE    HOME SITUATION  Type of Home: House  Home Layout: Two level  Lives With: Spouse                     Drives: Yes       Prior Level of Function: independent    SUBJECTIVE   Calm and cooperative     PAIN ASSESSMENT  Ratin          OBJECTIVE  Precautions: Sternal  Fall Risk: Standard fall risk    ASSESSMENTS    AM-PAC ‘6-Clicks’ Inpatient Daily Activity Short Form  -   Putting on and taking off regular lower body clothing?: A Lot  -   Bathing (including washing, rinsing, drying)?: A Lot  -   Toileting, which includes using toilet, bedpan or urinal? : A Lot  -   Putting on and taking off regular upper body clothing?: A Little  -   Taking care of personal grooming such as brushing teeth?: None  -   Eating meals?: None    AM-PAC Score:  Score: 17  Approx Degree of Impairment: 50.11%  Standardized Score (AM-PAC Scale): 37.26    ADDITIONAL TESTS     NEUROLOGICAL FINDINGS      COGNITION ASSESSMENTS     PLAN     OT Treatment Plan: Balance  activities, Energy conservation/work simplification techniques, ADL training, Functional transfer training, UE strengthening/ROM, Patient/Family training, Patient/Family education, Compensatory technique education  Rehab Potential : Good  Frequency: 3x/week  Number of Visits to Meet Established Goals: 1    ADL Goals   Patient will perform lower body dressing:  with supervision  Patient will perform toileting: with supervision    Functional Transfer Goals  Patient will transfer to toilet:  with supervision    UE Exercise Program Goal  Patient will be independent with bilateral AROM HEP (home exercise program).    Patient Evaluation Complexity Level:   Occupational Profile/Medical History LOW - Brief history including review of medical or therapy records    Specific performance deficits impacting engagement in ADL/IADL LOW  1 - 3 performance deficits    Client Assessment/Performance Deficits LOW - No comorbidities nor modifications of tasks    Clinical Decision Making LOW - Analysis of occupational profile, problem-focused assessments, limited treatment options    Overall Complexity LOW     OT Session Time: 30 minutes  Self-Care Home Management: 13 minutes  Therapeutic Activity: 10 minutes  Neuromuscular Re-education:  minutes  Therapeutic Exercise:  minutes  Cognitive Skills:  minutes  Sensory Integrative:  minutes  Orthotic Management and Training:  minutes  Can add/delete any of these

## 2025-06-25 NOTE — PROGRESS NOTES
[facility]   part of [facility]     Hospitalist Progress Note     Cain Davis Patient Status:  Inpatient    1965 MRN [id number]   Location [facility] 6NE-A Attending Jonathon Stern MD   Hosp Day # 1 PCP ZACH CHAVARRIA     Chief Complaint: I had valve surgery    Subjective:     Patient denies cp or sob    Objective:    Review of Systems:   A comprehensive review of systems was completed; pertinent positive and negatives stated in subjective.    Vital signs:  Temp:  [96.3 °F (35.7 °C)-100 °F (37.8 °C)] 98.3 °F (36.8 °C)  Pulse:  [63-91] 70  Resp:  [15-26] 15  BP: ()/(57-86) 99/59  SpO2:  [93 %-100 %] 98 %  AO: ()/() 106/51  FiO2 (%):  [40 %-60 %] 40 %    Physical Exam:    General: No acute distress  Respiratory: No wheezes, no rhonchi  Cardiovascular: S1, S2, regular rate and rhythm  Abdomen: Soft, Non-tender, non-distended, positive bowel sounds  Neuro: No new focal deficits.   Extremities: No edema      Diagnostic Data:    Labs:  Recent Labs   Lab 25  1102 25  1342 25  0316   WBC  --  4.5 7.9   HGB  --  10.6* 9.6*   MCV  --  85.3 85.9   .0* 167.0 165.0   INR 1.53* <0.69*  --        Recent Labs   Lab 25  1342 25  0316   * 121*   BUN 14 12   CREATSERUM 1.09 1.08   CA 9.4 8.6*    144   K 3.4* 3.7  3.7    109   CO2 26.0 25.0       Estimated Glomerular Filtration Rate: 80 mL/min/1.73m2 (result from lab).    No results for input(s): \"TROP\", \"TROPHS\", \"CK\" in the last 168 hours.    Recent Labs   Lab 25  1102 25  1342   PTP 18.5* <10.0*   INR 1.53* <0.69*                  Microbiology    No results found for this visit on 25.      Imaging: Reviewed in Epic.    Medications: Scheduled Medications[1]    Assessment & Plan:      #Dilated ascending thoracic aorta  #Bicuspid aortic valve  #Aortic stenosis  CVS primary  S/p AVR 25mm Inspiris bioprosthesis, replacement asc aorta, pfo clsure on  6/24  Insulin gtt  column A A1c 6.0 as of June 2025  ISS in sign and held  S/p extubation on 6/24 @ 10p, now on RA  S/p dobutamine gtt      #Hyperlipidemia     #Essential hypertension     #Anxiety     #Benign nonnodular prostatic hyperplasia         Yimi Hayward DO    Supplementary Documentation:     Quality:  DVT Mechanical Prophylaxis: ROHINI hose, SCDs,    DVT Pharmacologic Prophylaxis   Medication   None         DVT Pharmacologic prophylaxis: Aspirin 81 mg      Code Status: Not on file  Olsen: Olsen catheter in place  Olsen Duration (in days): 2  Central line:    MAGALIE:     Discharge is dependent on: post op recovery   At this point Mr. Davis is expected to be discharge to: suspect home    The 21st Century Cures Act makes medical notes like these available to patients in the interest of transparency. Please be advised this is a medical document. Medical documents are intended to carry relevant information, facts as evident, and the clinical opinion of the practitioner. The medical note is intended as peer to peer communication and may appear blunt or direct. It is written in medical language and may contain abbreviations or verbiage that are unfamiliar.                         [1]    sertraline  50 mg Oral Daily    tamsulosin  0.4 mg Oral Daily    sennosides  8.6 mg Oral BID    docusate sodium  100 mg Oral BID    metoprolol tartrate  12.5 mg Oral 2x Daily(Beta Blocker)    mupirocin  1 Application Nasal BID    aspirin  81 mg Oral Daily    pantoprazole  40 mg Intravenous QAM AC    Or    pantoprazole  40 mg Oral QAM AC    ceFAZolin  2 g Intravenous Q8H    rosuvastatin  10 mg Oral Nightly

## 2025-06-25 NOTE — PLAN OF CARE
Assumed care of pt this PM. Pt intubated on no sedation. Pt alert and follows commands and Aleksander w/ equal strength. Extubated at 2153 to oxy-mask weaned to RA @0000 . Dobutamine weaned off this AM. Insulin drip per post open heart protocol. Pt with all usual lines. [place] at 50.     CI: 1.8 to 3.3  CO:  3.5 to 6.4  Svo2: 74 to 83

## 2025-06-25 NOTE — PHYSICAL THERAPY NOTE
PHYSICAL THERAPY EVALUATION - INPATIENT     Room Number: 6604/6604-A  Evaluation Date: 6/25/2025  Type of Evaluation: Initial  Physician Order: PT Eval and Treat    Presenting Problem: s/p AVR 6/24/25  Co-Morbidities : hyperlipidemia  Reason for Therapy: Mobility Dysfunction and Discharge Planning    PHYSICAL THERAPY ASSESSMENT   Patient is a 59 year old male admitted 6/24/2025 for scheduled AVR, replacement of ascending aorta, and repair of PFO.  Prior to admission, patient's baseline is independent.  Patient is currently functioning near baseline with bed mobility, transfers, and gait.  Patient is requiring contact guard assist and minimal assist as a result of the following impairments: decreased functional strength and decreased endurance/aerobic capacity.  Physical Therapy will continue to follow for duration of hospitalization.    Patient will benefit from continued skilled PT Services for duration of hospitalization, however, given the patient is functioning near baseline level do not anticipate skilled therapy needs at discharge .    PLAN DURING HOSPITALIZATION  Nursing Mobility Recommendation : 1 Assist  PT Device Recommendation: Rolling walker  PT Treatment Plan: Bed mobility, Endurance, Energy conservation, Patient education, Gait training, Balance training, Transfer training, Strengthening  Rehab Potential : Good  Frequency (Obs): 3-5x/week     CURRENT GOALS    Goal #1 Patient is able to demonstrate supine - sit EOB @ level: supervision     Goal #2 Patient is able to demonstrate transfers EOB to/from BSC at assistance level: supervision     Goal #3 Patient is able to ambulate 150 feet with assist device: none at assistance level: supervision     Goal #4 Pt will ascend/descend 1 flight of stairs with supervision   Goal #5 Pt will demonstrate good understanding of sternal precautions/CV there-ex   Goal #6    Goal Comments: Goals established on 6/25/2025      HOME SITUATION  Type of Home: House  Home  Layout: Two level                     Lives With: Spouse    Drives: Yes          Prior Level of [place]: Pt lives with spouse in 2 [place] home. Pt independent with ADL and mobility. Pt does not use RW or cane at baseline. Pt works in insurance.     SUBJECTIVE  \"I think I'm just nervous.\"     OBJECTIVE  Precautions: Sternal  Fall Risk: Standard fall risk    WEIGHT BEARING RESTRICTION     PAIN ASSESSMENT  Ratin          COGNITION  Overall Cognitive Status:  WFL - within functional limits    RANGE OF MOTION AND STRENGTH ASSESSMENT  Upper extremity ROM and strength are within functional limits     Lower extremity ROM is within functional limits     Lower extremity strength is within functional limits     BALANCE  Static Sitting: Good  Dynamic Sitting: Good  Static Standing: Fair -  Dynamic Standing: Poor +    ADDITIONAL TESTS                                    ACTIVITY TOLERANCE                         O2 WALK  Oxygen Therapy  SPO2% on Room Air at Rest: 100  SPO2% Ambulation on Room Air: 99    NEUROLOGICAL FINDINGS                        AM-PAC '6-Clicks' INPATIENT SHORT FORM - BASIC MOBILITY  How much difficulty does the patient currently have...  Patient Difficulty: Turning over in bed (including adjusting bedclothes, sheets and blankets)?: A Little   Patient Difficulty: Sitting down on and standing up from a chair with arms (e.g., wheelchair, bedside commode, etc.): A Little   Patient Difficulty: Moving from lying on back to sitting on the side of the bed?: A Little   How much help from another person does the patient currently need...   Help from Another: Moving to and from a bed to a chair (including a wheelchair)?: A Little   Help from Another: Need to walk in hospital room?: A Little   Help from Another: Climbing 3-5 steps with a railing?: A Little     AM-PAC Score:  Raw Score: 18   Approx Degree of Impairment: 46.58%   Standardized Score (AM-PAC Scale): 43.63   CMS Modifier (G-Code): CK    FUNCTIONAL  ABILITY STATUS  Gait Assessment   Functional Mobility/Gait Assessment  Gait Assistance: Contact guard assist  Distance (ft): 50  Assistive Device: Rolling walker    Skilled Therapy Provided   VC for transfer set up.   Pt ambulatory with RW and CGA for balance/safety.   Ambulates with slow mae and tentative gait pattern.   VC for posture and pacing.   Returned room up in bathroom.   VC for toilet transfer.   Pt ambulatory back to bedside,   VC for log roll.   Sit-supine in bed with MIN assist for LE support.   Assisted with repositioning in bed.     Bed Mobility:  Rolling: supervision  Supine to sit: NT   Sit to supine: MIN     Transfer Mobility:  Sit to stand: CGA   Stand to sit: CGA  Gait = CGA    Therapist's Comments:  Reviewed sternal precautions, log roll, and activity recommendations.     Exercise/Education Provided:  Bed mobility  Energy conservation  Functional activity tolerated  Gait training  Posture  Strengthening  Transfer training    Patient End of Session: In bed, Needs met, Call light within reach, RN aware of session/findings, All patient questions and concerns addressed, Hospital anti-slip socks, Family present      Patient Evaluation Complexity Level:  History Moderate - 1 or 2 personal factors and/or co-morbidities   Examination of body systems Moderate - addressing a total of 3 or more elements   Clinical Presentation  Moderate - Evolving   Clinical Decision Making Moderate Complexity       PT Session Time: 25 minutes  Gait Training: 10 minutes  Therapeutic Activity:  minutes  Neuromuscular Re-education:  minutes  Therapeutic Exercise:  minutes

## 2025-06-25 NOTE — PROGRESS NOTES
[facility]   part of [facility]     Cardiology Progress Note        Cain Davis Patient Status:  Inpatient    1965 MRN [id number]   Location [facility] 6NE-A Attending Jonathon Stern MD   Hosp Day # 1 PCP ZACH CHAVARRIA       Subjective/ROS:  Resting in bed. Chest tubes just removed.     Objective:  BP 99/59   Pulse 70   Temp 98.3 °F (36.8 °C) (Temporal)   Resp 15   Ht 5' 7\" (1.702 m)   Wt 199 lb 12.8 oz (90.6 kg)   SpO2 98%   BMI 31.29 kg/m²     Temp (24hrs), Av.5 °F (36.9 °C), Min:96.3 °F (35.7 °C), Max:100 °F (37.8 °C)      Tele  Sr, bbb    Intake/Output:    Intake/Output Summary (Last 24 hours) at 2025 1008  Last data filed at 2025 0728  Gross per 24 hour   Intake 5173.75 ml   Output 4630 ml   Net 543.75 ml       Patient Weight for the past 72 hrs:   Weight   25 0557 189 lb (85.7 kg)   25 0601 199 lb 12.8 oz (90.6 kg)        Physical Exam:    Gen: alert, oriented x 3, NAD  Heent: pupils equal, reactive. Mucous membranes moist.   Neck: no jvd. RIJ line in place   Cardiac: regular rate and rhythm, normal S1,S2 with faint raymundo  Lungs: diminished bs, shallow insp effort  Abd: soft, NT/ND +bs  Ext: mild generalized edema  Skin: Warm, dry  Neuro: No focal deficits    Labs:  Lab Results   Component Value Date    WBC 7.9 2025    HGB 9.6 2025    HCT 28.1 2025    .0 2025    CREATSERUM 1.08 2025    BUN 12 2025     2025    K 3.7 2025    K 3.7 2025     2025    CO2 25.0 2025     2025    CA 8.6 2025    PTT 25.7 2025    INR <0.69 2025    MG 1.8 2025       CXR: Reviewed. 1. Mild interstitial opacities which may be vascular crowding due to low inspiratory volumes but mild edema cannot be excluded.   2. Left basilar opacity represent atelectasis versus infiltrates.     Medications:    Scheduled Medications[1]  Medication  Infusions[2]    Assessment:    Pod 1 s/p avr, replacement asc ao, pfo closure- EF preserved  Hemodynamically stable, off gtts. CO/CI 6.0/3.0  PAP 45/21  EKG with ivcd  Low grade temps- suspect reactive  Expected anemia- follow trends  Volume overload- will needs eventual diuretics    Plan:     Ongoing early post op recovery  De-line as day progresses and start to increase activity  Eventual diuresis- likely tomorrow.       Lida Epstein NP  [phone]      Patient seen and examined. Agree with the findings and plan of care.  Cont post op care         [1]    insulin aspart  1-10 Units Subcutaneous TID AC and HS    sertraline  50 mg Oral Daily    tamsulosin  0.4 mg Oral Daily    sennosides  8.6 mg Oral BID    docusate sodium  100 mg Oral BID    metoprolol tartrate  12.5 mg Oral 2x Daily(Beta Blocker)    mupirocin  1 Application Nasal BID    aspirin  81 mg Oral Daily    pantoprazole  40 mg Intravenous QAM AC    Or    pantoprazole  40 mg Oral QAM AC    ceFAZolin  2 g Intravenous Q8H    rosuvastatin  10 mg Oral Nightly   [2]    insulin regular Stopped (06/25/25 1007)    sodium chloride 10 mL/hr at 06/25/25 0656    DOBUTamine Stopped (06/25/25 0618)    nitroGLYCERIN in dextrose 5% Stopped (06/24/25 1400)    norepinephrine      NitroPRUSSide (Nipride) 50 mg in dextrose 5% 250 mL infusion      dextrose 5%-sodium chloride 0.45% 30 mL/hr (06/25/25 0656)    sodium chloride 10 mL/hr at 06/25/25 0656    HYDROmorphone in sodium chloride 0.9%

## 2025-06-25 NOTE — PROGRESS NOTES
Assumed care of patient from CVOR approx 1323. W/usual lines. Vented and sedated on precedex and propofol. Dobs at 1mcq. NTG to maintain SBP <140. Usual lines in place. See flowsheets for CT and ospina output. Plans to extubate this evening.

## 2025-06-25 NOTE — OPERATIVE REPORT
[facility]    PATIENT'S NAME: CAIN SANTANA   ATTENDING PHYSICIAN: Jontahon Stern MD   OPERATING PHYSICIAN: Jonathon Stern MD   PATIENT ACCOUNT#:   [id number]    LOCATION:  [address] [place] [address]  MEDICAL RECORD #:   [id number]       YOB: 1965  ADMISSION DATE:       06/24/2025      OPERATION DATE:  06/24/2025    OPERATIVE REPORT    PREOPERATIVE DIAGNOSIS:    1.   Aortic stenosis.  2.   Aortic insufficiency.  3.   Ascending aortic aneurysm.  4.   Patent foramen ovale.  POSTOPERATIVE DIAGNOSIS:    1.   Aortic stenosis.  2.   Aortic insufficiency.  3.   Ascending aortic aneurysm.  4.   Patent foramen ovale.  PROCEDURE:    1.   Aortic valve replacement, 25 mm Inspiris bovine pericardial valve.  2.   Replacement of ascending aorta with 28 mm Hemashield graft.  3.   Repair of patent foramen ovale.    ASSISTANT:  Cain Knowles PA-C    ANESTHESIA:  General endotracheal.    BLOOD LOSS:  600 mL.    COMPLICATIONS:  None.    INDICATIONS:  The patient is a very pleasant 59-year-old gentleman who we have been following for a bit of time.  His cardiac conditions are aortic stenosis, aortic insufficiency being the primary lesion, ascending aortic aneurysm 4.6 cm, and PFO.  Coronaries are nonobstructed.  Risks, benefits, and options for surgical treatment were discussed versus other treatment modalities.  The patient has selected a tissue valve.    OPERATIVE TECHNIQUE:  Appropriate lines and catheters were placed.  General anesthesia was induced.  [place]-Rub[place] was placed.  Transesophageal echo was placed.  Transcranial oximetry was placed.  The PFO was visualized on YANELIS color Doppler.  The patient was prepped and draped.  Sternotomy was performed.  Pericardium was opened.  Ascending aorta was definitely generous and quite pulsatile due to the AI.  Pursestrings were placed in the upper ascending aorta and SVC and IVC for bicaval cannulation.  The patient was heparinized.  Aorta was cannulated with a 20-[place]i  arterial cannula.  SVC and IVC were cannulated with 24-Northern Irish and 30-Northern Irish cannulas, respectively.  Bypass was established.  The patient was cooled to 24 degrees centigrade.  The aorta was crossclamped.  Blood cardioplegia was given antegrade to achieve electromechanical arrest of the heart.  This was given intermittently due to the AI.  Caval tapes were snared.  Right atrium was opened obliquely.  The PFO was readily apparent and was actually quite good size, being approximately 1 cm in diameter and had sort of a hooded covering of atrial tissue over it.  This was closed with 5 interrupted 4-0 Prolene sutures.  Completing this, when bagging the lungs, no red blood came through the PFO.  Right atriotomy was closed.  Due to the atriotomy proximity to normal placement of retrograde cannula, I elected to skip the retrograde.  Vent was placed via the right superior pulmonary vein.  Aorta was opened at the sinotubular junction.  Valve was inspected and found to be quite extensively calcified with fusion of the right and left cusps.  The aorta was quite thin; as such, I elected to replace it.  This was resected up to the base of the innominate artery.  The valve was excised, and the tissue annulus was thoroughly debrided with care being taken to remove all calcific debris.  Cardioplegia was intermittently given retrograde directly via the right and left main coronary ostia.  The right coronary ostium was quite small.  The area was sized and found to accommodate a 25 mm valve, which was prepared while valve sutures were placed.  Sutures were passed through the sewing ring of the valve which was lowered in place and secured and found to fit perfectly.  This was secured using both Cor-Knot and manual knots.  I could easily give cardioplegia after seating the valve.  The proximal and distal transected ends of this aorta were reinforced with a strip of Rubens felt due to the thinness of the aorta.  Then, a 28 mm graft was  sutured end-to-end to the distal transected end of the aorta.  This set up nicely.  While beginning to rewarm, the proximal anastomosis was constructed using 2 layers of 3-0 Prolene suture.  Prior to complete closure, the heart was manually de-aired.  With the patient in Trendelenburg position, warm antegrade cardioplegia was given with normal distention of the aortic graft.  With the patient in Trendelenburg position, the aorta was unclamped.  Following complete and thorough rewarming and de-airing, the patient was weaned from bypass without difficulty.  Pump time 148 minutes.  Crossclamp 118 minutes.  Cardioplegia 2.7 L.  The patient was decannulated.  Protamine was administered.  Pacing leads were applied to the heart.  Chest was closed in the usual fashion.  When I was satisfied, the patient was taken to the ICU in stable condition.  The patient's family was updated by me in person regarding the patient's condition and the conduct of the operation.    Dictated By Jonathon Stern MD  d: 06/25/2025 06:53:28  t: 06/25/2025 09:35:41  [facility] [id number]  /

## 2025-06-26 PROBLEM — I35.0 AORTIC STENOSIS: Status: ACTIVE | Noted: 2025-06-26

## 2025-06-26 LAB
ANION GAP SERPL CALC-SCNC: 9 MMOL/L (ref 0–18)
ATRIAL RATE: 67 BPM
BLOOD TYPE BARCODE: 7300
BUN BLD-MCNC: 11 MG/DL (ref 9–23)
CALCIUM BLD-MCNC: 8.8 MG/DL (ref 8.7–10.6)
CHLORIDE SERPL-SCNC: 102 MMOL/L (ref 98–112)
CO2 SERPL-SCNC: 28 MMOL/L (ref 21–32)
CREAT BLD-MCNC: 1.17 MG/DL (ref 0.7–1.3)
EGFRCR SERPLBLD CKD-EPI 2021: 73 ML/MIN/1.73M2 (ref 60–?)
ERYTHROCYTE [DISTWIDTH] IN BLOOD BY AUTOMATED COUNT: 13.6 %
GLUCOSE BLD-MCNC: 170 MG/DL (ref 70–99)
GLUCOSE BLD-MCNC: 176 MG/DL (ref 70–99)
GLUCOSE BLD-MCNC: 191 MG/DL (ref 70–99)
GLUCOSE BLD-MCNC: 220 MG/DL (ref 70–99)
GLUCOSE BLD-MCNC: 229 MG/DL (ref 70–99)
GLUCOSE BLD-MCNC: 234 MG/DL (ref 70–99)
HCT VFR BLD AUTO: 28.7 % (ref 39–53)
HGB BLD-MCNC: 9.4 G/DL (ref 13–17.5)
MAGNESIUM SERPL-MCNC: 2.1 MG/DL (ref 1.6–2.6)
MCH RBC QN AUTO: 29.1 PG (ref 26–34)
MCHC RBC AUTO-ENTMCNC: 32.8 G/DL (ref 31–37)
MCV RBC AUTO: 88.9 FL (ref 80–100)
OSMOLALITY SERPL CALC.SUM OF ELEC: 292 MOSM/KG (ref 275–295)
P AXIS: 30 DEGREES
P-R INTERVAL: 158 MS
PLATELET # BLD AUTO: 157 10(3)UL (ref 150–450)
POTASSIUM SERPL-SCNC: 4 MMOL/L (ref 3.5–5.1)
POTASSIUM SERPL-SCNC: 4 MMOL/L (ref 3.5–5.1)
Q-T INTERVAL: 446 MS
QRS DURATION: 136 MS
QTC CALCULATION (BEZET): 471 MS
R AXIS: 25 DEGREES
RBC # BLD AUTO: 3.23 X10(6)UL (ref 4.3–5.7)
SODIUM SERPL-SCNC: 139 MMOL/L (ref 136–145)
T AXIS: 2 DEGREES
UNIT VOLUME: 350 ML
VENTRICULAR RATE: 67 BPM
WBC # BLD AUTO: 11.5 X10(3) UL (ref 4–11)

## 2025-06-26 PROCEDURE — 99233 SBSQ HOSP IP/OBS HIGH 50: CPT | Performed by: HOSPITALIST

## 2025-06-26 RX ORDER — SIMETHICONE 80 MG
160 TABLET,CHEWABLE ORAL 3 TIMES DAILY
Status: DISCONTINUED | OUTPATIENT
Start: 2025-06-26 | End: 2025-06-28

## 2025-06-26 RX ORDER — ACETAMINOPHEN 10 MG/ML
1000 INJECTION, SOLUTION INTRAVENOUS EVERY 6 HOURS
Status: DISCONTINUED | OUTPATIENT
Start: 2025-06-26 | End: 2025-06-27

## 2025-06-26 RX ORDER — SIMETHICONE 80 MG
80 TABLET,CHEWABLE ORAL ONCE
Status: COMPLETED | OUTPATIENT
Start: 2025-06-26 | End: 2025-06-26

## 2025-06-26 RX ORDER — HYDROMORPHONE HYDROCHLORIDE 1 MG/ML
0.2 INJECTION, SOLUTION INTRAMUSCULAR; INTRAVENOUS; SUBCUTANEOUS EVERY 2 HOUR PRN
Refills: 0 | Status: DISCONTINUED | OUTPATIENT
Start: 2025-06-26 | End: 2025-06-28

## 2025-06-26 RX ORDER — FUROSEMIDE 10 MG/ML
40 INJECTION INTRAMUSCULAR; INTRAVENOUS
Status: DISCONTINUED | OUTPATIENT
Start: 2025-06-27 | End: 2025-06-28

## 2025-06-26 RX ORDER — SIMETHICONE 80 MG
80 TABLET,CHEWABLE ORAL 3 TIMES DAILY
Status: DISCONTINUED | OUTPATIENT
Start: 2025-06-26 | End: 2025-06-28

## 2025-06-26 RX ORDER — KETOROLAC TROMETHAMINE 30 MG/ML
30 INJECTION, SOLUTION INTRAMUSCULAR; INTRAVENOUS EVERY 6 HOURS
Status: DISPENSED | OUTPATIENT
Start: 2025-06-26 | End: 2025-06-28

## 2025-06-26 RX ORDER — HYDROMORPHONE HYDROCHLORIDE 1 MG/ML
0.4 INJECTION, SOLUTION INTRAMUSCULAR; INTRAVENOUS; SUBCUTANEOUS EVERY 2 HOUR PRN
Refills: 0 | Status: DISCONTINUED | OUTPATIENT
Start: 2025-06-26 | End: 2025-06-28

## 2025-06-26 RX ORDER — FUROSEMIDE 10 MG/ML
40 INJECTION INTRAMUSCULAR; INTRAVENOUS ONCE
Status: COMPLETED | OUTPATIENT
Start: 2025-06-26 | End: 2025-06-26

## 2025-06-26 NOTE — PROGRESS NOTES
[facility]   part of Located within [facility]     Hospitalist Progress Note     Cain Davis Patient Status:  Inpatient    1965 MRN [id number]   Location [facility] 6NE-A Attending Jonathon Stern MD   Hosp Day # 2 PCP ZACH CHAVARRIA     Chief Complaint: I had valve surgery    Subjective:     Pt having chest pain and back pain. Son at the bedside. He works as a derm PA. Had multiple questions which were answered to the best of my ability     Objective:    Review of Systems:   A comprehensive review of systems was completed; pertinent positive and negatives stated in subjective.    Vital signs:  Temp:  [97.7 °F (36.5 °C)-98.5 °F (36.9 °C)] 98.4 °F (36.9 °C)  Pulse:  [65-80] 65  Resp:  [18-29] 24  BP: ()/(45-80) 99/63  SpO2:  [88 %-100 %] 92 %    Physical Exam:    General: No acute distress  Respiratory: No wheezes, no rhonchi  Cardiovascular: S1, S2, regular rate and rhythm  Abdomen: Soft, Non-tender, non-distended, positive bowel sounds  Neuro: No new focal deficits.   Extremities: No edema      Diagnostic Data:    Labs:  Recent Labs   Lab 25  1102 25  1342 25  0316 25  0340   WBC  --  4.5 7.9 11.5*   HGB  --  10.6* 9.6* 9.4*   MCV  --  85.3 85.9 88.9   .0* 167.0 165.0 157.0   INR 1.53* <0.69*  --   --        Recent Labs   Lab 25  1342 25  0316 25  0340   * 121* 176*   BUN 14 12 11   CREATSERUM 1.09 1.08 1.17   CA 9.4 8.6* 8.8    144 139   K 3.4* 3.7  3.7 4.0  4.0    109 102   CO2 26.0 25.0 28.0       Estimated Glomerular Filtration Rate: 73 mL/min/1.73m2 (result from lab).    No results for input(s): \"TROP\", \"TROPHS\", \"CK\" in the last 168 hours.    Recent Labs   Lab 25  1102 25  1342   PTP 18.5* <10.0*   INR 1.53* <0.69*                  Microbiology    No results found for this visit on 25.      Imaging: Reviewed in Epic.    Medications: Scheduled Medications[1]    Assessment & Plan:      #Dilated  ascending thoracic aorta  #Bicuspid aortic valve  #Aortic stenosis  CVS primary  S/p AVR 25mm Inspiris bioprosthesis, replacement asc aorta, pfo clsure on 6/24  A1c 6.0 as of June 2025  ISS  S/p extubation on 6/24 @ 10p, now on 2L, encouraged IS; likely has component of atelectasis   S/p dobutamine gtt   X1 iv solumedrol today    #MSK back pain  Lidocaine patch     #Hyperlipidemia     #Essential hypertension     #Anxiety     #Benign nonnodular prostatic hyperplasia         Yimi Hayward DO    Supplementary Documentation:     Quality:  DVT Mechanical Prophylaxis: ROHINI hose, SCDs,    DVT Pharmacologic Prophylaxis   Medication   None         DVT Pharmacologic prophylaxis: Aspirin 81 mg      Code Status: Not on file  Olsen: No urinary catheter in place  Olsen Duration (in days): 2  Central line:    MAGALIE:     Discharge is dependent on: post op recovery   At this point Mr. Davis is expected to be discharge to: suspect home    The 21st Century Cures Act makes medical notes like these available to patients in the interest of transparency. Please be advised this is a medical document. Medical documents are intended to carry relevant information, facts as evident, and the clinical opinion of the practitioner. The medical note is intended as peer to peer communication and may appear blunt or direct. It is written in medical language and may contain abbreviations or verbiage that are unfamiliar.                         [1]    ketorolac  30 mg Intravenous Q6H    acetaminophen  1,000 mg Intravenous Q6H    simethicone  80 mg Oral TID    Or    simethicone  160 mg Oral TID    insulin aspart  1-10 Units Subcutaneous TID AC and HS    sertraline  50 mg Oral Daily    tamsulosin  0.4 mg Oral Daily    sennosides  8.6 mg Oral BID    docusate sodium  100 mg Oral BID    metoprolol tartrate  12.5 mg Oral 2x Daily(Beta Blocker)    mupirocin  1 Application Nasal BID    aspirin  81 mg Oral Daily    pantoprazole  40 mg Intravenous QAM AC    Or     pantoprazole  40 mg Oral QAM AC    rosuvastatin  10 mg Oral Nightly

## 2025-06-26 NOTE — PROGRESS NOTES
[facility]   part of [facility]     Cardiology Progress Note        Cain Davis Patient Status:  Inpatient    1965 MRN [id number]   Location [facility] 6NE-A Attending Jonathon Stern MD   Hosp Day # 2 PCP ZACH CHAVARRIA       Subjective/ROS:  A bit lethargic, drifts off during conversation. Complains of abd bloating and fullness. + passing gas. Has not had BM. Appetite poor. Pain control fair. Denies sob.     Objective:  /67   Pulse 68   Temp 98.4 °F (36.9 °C) (Temporal)   Resp 20   Ht 5' 7\" (1.702 m)   Wt 201 lb 9.6 oz (91.4 kg)   SpO2 96%   BMI 31.58 kg/m²     Temp (24hrs), Av.2 °F (36.8 °C), Min:97.7 °F (36.5 °C), Max:98.5 °F (36.9 °C)      Tele  Sinus rhythm    Intake/Output:    Intake/Output Summary (Last 24 hours) at 2025 0901  Last data filed at 2025 0851  Gross per 24 hour   Intake 1035 ml   Output 835 ml   Net 200 ml       Patient Weight for the past 72 hrs:   Weight   25 0557 189 lb (85.7 kg)   25 0601 199 lb 12.8 oz (90.6 kg)        Physical Exam:    Gen: alert, oriented but drifts off easily  Heent: pupils equal, reactive. Mucous membranes moist.   Neck: no jvd. RIJ line in place   Cardiac: regular rate and rhythm, normal S1,S2 with 2/6 raymundo  Lungs: diminished bases but otherwise clear.   Abd: soft, distended but not tender. + bs   Ext: mild generalized edema  Skin: Warm, dry  Neuro: No focal deficits    Labs:  Lab Results   Component Value Date    WBC 11.5 2025    HGB 9.4 2025    HCT 28.7 2025    .0 2025    CREATSERUM 1.17 2025    BUN 11 2025     2025    K 4.0 2025    K 4.0 2025     2025    CO2 28.0 2025     2025    CA 8.8 2025    MG 2.1 2025           CV Medications:    Asa 81 mg daily  Lopressor 12.5 mg bid  Crestor 10 mg daily      Assessment:    Pod 2 s/p avr, replacement asc ao, pfo closure- EF  preserved  Hemodynamically stable  Low grade temps, leukocytosis- suspect reactive  Expected anemia- hgb stable   Volume overload- will start diuretics today   HL- on statin     Plan:     IV lasix. Assess bp tolerance and redose as able.   Plan for dose of IV solumedrol today  Needs to work on IS and increasing activity    Discussed at length with son who is a derm PA, pt, [facility] and Dr Hayward.     Lida Epstein, NP  [phone]      Patient seen and examined. Agree with the findings and plan of care. Diuresis as able

## 2025-06-26 NOTE — H&P
RE: Cain SANTANA  :  1965    Mr. Santana is a very pleasant 59-year-old gentleman.  He has known of a systolic heart murmur for over 20 years.  Approximately 12 years ago this was determined to be due to a bicuspid aortic valve.  The patient has been followed relatively religiously for this.  Recently now further workup has been undertaken.       Specifically, the patient has undergone transthoracic echo, transesophageal echo, and calcium scoring CT.  The sum of these studies demonstrates that he does, in fact, have a bicuspid aortic valve.  He has moderate aortic stenosis and at least moderate aortic insufficiency, normal systolic function, normal LV size, and sinus rhythm.  Of note on his calcium study his coronary scores are all 0.       Of interest is his son, who is 28 years old, who is also known to have a bicuspid aortic valve.       From a symptomatic standpoint he does seem to be symptomatic in that activities that he could do two or three years ago without too much difficulty now cause dyspnea and fatigue requiring him to sit down and rest for quite a period of time.  This was not the case a few years ago.       Past medical history is otherwise fairly unremarkable.  He is not diabetic.  There is no history of myocardial infarction, stent, stroke, renal disease, or liver disease.  He does not smoke.  He is not treated for hypertension.  He is treated for dyslipidemia.  There is no history of peptic ulcer disease, TB, cancer, or vein stripping.       The patient has no known drug allergies.      On review of systems, the patient has no complaints of blurred vision or hearing problems.  The patient denies palpitations, wheezing, syncope, or dizziness.  There are no symptoms of GI reflux, urgency or frequency of urination, or rashes.  The patient has normal mobility.  The patient denies any history of bleeding disorders.     Physical examination shows a gentleman who is 5'7” tall and weighs 185  pounds.  BMI is 29.  BSA is 1.96 m2.  Pupils are equal and round.  Carotid pulses are equal.  I don't hear bruits.  The lungs are clear without rales, rhonchi, or wheezing.  Cardiac examination shows a quiet precordium.  S1 and S2 are normal.  He has a grade 2/6 systolic murmur and a grade 2/4 diastolic murmur.  Examination of the extremities shows no cyanosis, clubbing, or edema.     We have, today, discussed the nature of the patient's cardiovascular condition that being bicuspid aortic valve which is moderately stenotic, moderately insufficient, but significantly symptomatic.  He also has a 4.1 cm ascending aorta from the calcium study.  I think based on his symptoms and echo findings primarily the patient meets indication for correction of symptomatic aortic insufficiency primarily.  At surgery we would replace the aortic valve (the patient has selected a tissue valve) and we would take a careful look at the aorta.  I would have a very low threshold to replace that aorta as we are only 4 mm away from the accepted 4.5 cm guideline.  I don't think he needs coronary angiography and I don't think he needs bypass surgery.       Of note, however, he was found to have a small PFO on YANELIS.  If we can visualize it on the YANELIS in the operating room I would repair that as well.     Operative risk for aortic valve plus/minus ascending aorta is probably on the order of 1-2% and I have discussed the STS risk factors and score with the patient.       The patient and his wife are leaning toward moving forward with surgery so he can be well recovered by the time they take a family vacation to [place] in November.  I will let them pick a date.  He needs no further workup, only his preop teaching, and we're ready to go.      Thank you for allowing us to see this patient in consultation.    Addendum:  We had Mr. Davis scheduled for upcoming aortic valve replacement plus/minus ascending aorta.       The patient has a bicuspid aortic  valve with moderate aortic stenosis, moderate aortic insufficiency, and has symptomatic dyspnea with exertion.  We are going to replace the valve using a tissue valve.       The issue is regarding his ascending aorta, which is definitely generous.  In the root is measures 4.5 cm or 4.6 cm.  In the ascending aorta in the mid portion it measures 4.1 cm.  He is only 59 years old.  I would have a relatively low threshold to replace it.  I think I would list the operation as aortic valve replacement plus/minus ascending aorta to be judged intraoperatively at the time of surgery.          Sincerely,    Jonathon Stern M.D.  JACY/cds

## 2025-06-26 NOTE — PROGRESS NOTES
[facility]   part of [facility]     CV Surgery Progress Note    Cain Davis Patient Status:  Inpatient    1965 MRN [id number]   Location [facility] 6NE-A Attending Jonathon Stern MD   Hosp Day # 2 PCP ZACH CHAVARRIA     Subjective:  Patient appears drowsy, reports pain was moderate this morning, so medications were changed. Denies any dyspnea. Feels bloated.     Tele: SR    Objective:  /67   Pulse 71   Temp 98.4 °F (36.9 °C) (Temporal)   Resp 25   Ht 5' 7\" (1.702 m)   Wt 201 lb 9.6 oz (91.4 kg)   SpO2 93%   BMI 31.58 kg/m²     Intake/Output:    Intake/Output Summary (Last 24 hours) at 2025 0834  Last data filed at 2025 0342  Gross per 24 hour   Intake 1035 ml   Output 735 ml   Net 300 ml       Labs:  Lab Results   Component Value Date    WBC 11.5 2025    RBC 3.23 2025    HGB 9.4 2025    HCT 28.7 2025    MCV 88.9 2025    MCH 29.1 2025    MCHC 32.8 2025    RDW 13.6 2025    .0 2025     Lab Results   Component Value Date     2025    K 4.0 2025    K 4.0 2025     2025    CO2 28.0 2025    BUN 11 2025    CREATSERUM 1.17 2025     2025    CA 8.8 2025     Lab Results   Component Value Date    INR <0.69 (L) 2025    INR 1.53 (H) 2025    INR 0.99 2025       Physical Exam:  General: VSS, A&Ox3, In NAD  Neck: No JVD.  Lungs: diminished at bases   Heart: S1,S2 RRR. Soft raymundo. Sternum Stable   Abdomen: Soft, non-tender, +bs  Extremities: Warm, dry, +generalized edema  Skin: sternotomy incision C/D/I  Neuro: no focal deficits     Assessment/Plan:   S/P AVR with a 25mm Inspiris bovine pericardial valve, ascending aorta replacement with a 28mm Hemashield graft and closure of PFO POD#2  -HD stable, off support  -?PPS, giving 1g IV solumedrol   -Leukocytosis, afebrile, likely reactive- monitor   -Acute post op blood loss anemia,  expected, stable- monitor   -Vol OL, start IV lasix   -Renal function intact, good UOP  -Bowel regimen   -Pain management, tylenol/toradol/dilaudid   -Chest tubes removed   -Keep PW for now   -GI PPX: protonix  -DVT PPX: TEDs/SCDs  -Encourage IS/ambulation   -PT/OT/Cardiac rehab   -CCU monitoring     -D/W Dr. Leena Reece PA-C   Cardiothoracic Surgery   6/26/2025  8:35 AM

## 2025-06-26 NOTE — PLAN OF CARE
Assumed care of pt at 0730. PT up in the chair since 0600. Orders received from CV surgery to deline patient except for epicardial pacer wires. Vital signs stable. Pt tolerated removal of invasive lines well. Post chest tube removal CXR done. Pt put on 2L of oxygen as he was desating into the upper 80s on room air while he was lying in bed. Pt instructed on incentive spirometer and he demonstrated inspiration to 1000. PT states he is having more pain now. He was transitioned to oral [place] by CV surgery because he seemed to drowsy on the dilaudid PCA. Plan to increase to 2 tablets at bedtime tonight.

## 2025-06-26 NOTE — PLAN OF CARE
Assumed care this am.  Pt c/o gas pain and stomach distention along with back pain (hx chronic back pain). New meds given as ordered and pt states \"stomach pain is better and back pain is minimal\"   EKG done see chart for results  Pt requiring 1-2L oxygen when sleeping, O2 sats decrease to 88%  IS encouraged.   Pt ambulating 250ft with gaitbelt/ tolerates well.  PW remain    Problem: Patient/Family Goals  Goal: Patient/Family Long Term Goal  Description: Patient's Long Term Goal: discharge home with home health    Interventions:  - increase activity  -pain control with current pain regimen  - See additional Care Plan goals for specific interventions  Outcome: Progressing  Goal: Patient/Family Short Term Goal  Description: Patient's Short Term Goal: wean off O2    Interventions:   - increase activity  -increase IS every hr while awake,   -encourage pt to cough and deep breathe  -Pain control   - See additional Care Plan goals for specific interventions  Outcome: Progressing

## 2025-06-26 NOTE — PLAN OF CARE
Assumed care of pt this PM. Pt in NSR on the monitor. Pt alert and follows commands and [place]ks[place] w/ equal strength. Pacer wires intact.

## 2025-06-27 LAB
ANION GAP SERPL CALC-SCNC: 7 MMOL/L (ref 0–18)
BUN BLD-MCNC: 23 MG/DL (ref 9–23)
CALCIUM BLD-MCNC: 9.2 MG/DL (ref 8.7–10.6)
CHLORIDE SERPL-SCNC: 102 MMOL/L (ref 98–112)
CO2 SERPL-SCNC: 30 MMOL/L (ref 21–32)
CREAT BLD-MCNC: 1.33 MG/DL (ref 0.7–1.3)
EGFRCR SERPLBLD CKD-EPI 2021: 62 ML/MIN/1.73M2 (ref 60–?)
ERYTHROCYTE [DISTWIDTH] IN BLOOD BY AUTOMATED COUNT: 13.2 %
GLUCOSE BLD-MCNC: 130 MG/DL (ref 70–99)
GLUCOSE BLD-MCNC: 164 MG/DL (ref 70–99)
GLUCOSE BLD-MCNC: 167 MG/DL (ref 70–99)
GLUCOSE BLD-MCNC: 175 MG/DL (ref 70–99)
GLUCOSE BLD-MCNC: 222 MG/DL (ref 70–99)
HCT VFR BLD AUTO: 26.6 % (ref 39–53)
HGB BLD-MCNC: 9.1 G/DL (ref 13–17.5)
MCH RBC QN AUTO: 29.5 PG (ref 26–34)
MCHC RBC AUTO-ENTMCNC: 34.2 G/DL (ref 31–37)
MCV RBC AUTO: 86.4 FL (ref 80–100)
OSMOLALITY SERPL CALC.SUM OF ELEC: 296 MOSM/KG (ref 275–295)
PLATELET # BLD AUTO: 148 10(3)UL (ref 150–450)
POTASSIUM SERPL-SCNC: 4.2 MMOL/L (ref 3.5–5.1)
RBC # BLD AUTO: 3.08 X10(6)UL (ref 4.3–5.7)
SODIUM SERPL-SCNC: 139 MMOL/L (ref 136–145)
WBC # BLD AUTO: 10 X10(3) UL (ref 4–11)

## 2025-06-27 PROCEDURE — 99232 SBSQ HOSP IP/OBS MODERATE 35: CPT | Performed by: HOSPITALIST

## 2025-06-27 RX ORDER — ACETAMINOPHEN 500 MG
1000 TABLET ORAL EVERY 6 HOURS PRN
Status: DISCONTINUED | OUTPATIENT
Start: 2025-06-27 | End: 2025-06-27

## 2025-06-27 RX ORDER — ACETAMINOPHEN 500 MG
1000 TABLET ORAL EVERY 6 HOURS PRN
Status: DISCONTINUED | OUTPATIENT
Start: 2025-06-27 | End: 2025-06-28

## 2025-06-27 RX ORDER — ACETAMINOPHEN 500 MG
500 TABLET ORAL EVERY 6 HOURS PRN
Status: DISCONTINUED | OUTPATIENT
Start: 2025-06-27 | End: 2025-06-28

## 2025-06-27 RX ORDER — ACETAMINOPHEN 500 MG
1000 TABLET ORAL EVERY 6 HOURS
Status: DISCONTINUED | OUTPATIENT
Start: 2025-06-27 | End: 2025-06-27

## 2025-06-27 RX ORDER — HYDROCODONE BITARTRATE AND ACETAMINOPHEN 5; 325 MG/1; MG/1
1 TABLET ORAL EVERY 6 HOURS PRN
Qty: 30 TABLET | Refills: 0 | Status: SHIPPED | OUTPATIENT
Start: 2025-06-27

## 2025-06-27 NOTE — PLAN OF CARE
Assumed care of patient at 0900. Patient is alert and oriented x4. Patient is on RA with adequate oxygen saturations. Continuous pulse ox maintained. Patient is in NSR with a BBB per telemetry. Patient is continent of bowel and bladder. Patient denies pain at this time. Patient is up standby assist in the hallways. Patient updated on plan for day and agreeable. Safety precautions in place and maintained.    Plan of Care  - IV lasix BID  - ambulate   - pain management  1300- pacer wires removed with DAVID Harkins. No complications. Explained to pt to remain bedrest until 2pm. Pt tolerated removal well.   Problem: CARDIOVASCULAR - ADULT  Goal: Maintains optimal cardiac output and hemodynamic stability  Description: INTERVENTIONS:  - Monitor vital signs, rhythm, and trends  - Monitor for bleeding, hypotension and signs of decreased cardiac output  - Evaluate effectiveness of vasoactive medications to optimize hemodynamic stability  - Monitor arterial and/or venous puncture sites for bleeding and/or hematoma  - Assess quality of pulses, skin color and temperature  - Assess for signs of decreased coronary artery perfusion - ex. Angina  - Evaluate fluid balance, assess for edema, trend weights  Outcome: Progressing     Problem: RESPIRATORY - ADULT  Goal: Achieves optimal ventilation and oxygenation  Description: INTERVENTIONS:  - Assess for changes in respiratory status  - Assess for changes in mentation and behavior  - Position to facilitate oxygenation and minimize respiratory effort  - Oxygen supplementation based on oxygen saturation or ABGs  - Provide Smoking Cessation handout, if applicable  - Encourage broncho-pulmonary hygiene including cough, deep breathe, Incentive Spirometry  - Assess the need for suctioning and perform as needed  - Assess and instruct to report SOB or any respiratory difficulty  - Respiratory Therapy support as indicated  - Manage/alleviate anxiety  - Monitor for signs/symptoms of CO2  retention  Outcome: Progressing     Problem: SKIN/TISSUE INTEGRITY - ADULT  Goal: Incision(s), wounds(s) or drain site(s) healing without S/S of infection  Description: INTERVENTIONS:  - Assess and document risk factors for pressure ulcer development  - Assess and document skin integrity  - Assess and document dressing/incision, wound bed, drain sites and surrounding tissue  - Implement wound care per orders  - Initiate isolation precautions as appropriate  - Initiate Pressure Ulcer prevention bundle as indicated  Outcome: Progressing     Problem: Diabetes/Glucose Control  Goal: Glucose maintained within prescribed range  Description: INTERVENTIONS:  - Monitor Blood Glucose as ordered  - Assess for signs and symptoms of hyperglycemia and hypoglycemia  - Administer ordered medications to maintain glucose within target range  - Assess barriers to adequate nutritional intake and initiate nutrition consult as needed  - Instruct patient on self management of diabetes  Outcome: Progressing

## 2025-06-27 NOTE — PLAN OF CARE
Assumed care of pt this PM. In NSR pacer wires still intact and secured to dressing. BP remains normotensive without intervention. Requiring 2L w/ sleep on RA while awake. Pt stating that gas pain is much better this evening. Pain very well controlled with alternating Offrimev and Toradol. Ambulating well and voiding well. Report called to CTU 8.

## 2025-06-27 NOTE — PROGRESS NOTES
[facility]   part of [facility]     CV Surgery Progress Note    Cain Davis Patient Status:  Inpatient    1965 MRN [id number]   Location [facility] 6NE-A Attending Jonathon Stern MD   Hosp Day # 3 PCP ZACH CHAVARRIA     Subjective:  Patient more alert today. Reports feeling good. Denies any pain or dyspnea. Ambulating well.     Tele: SR    Objective:  /64 (BP Location: Left arm)   Pulse 75   Temp 99 °F (37.2 °C) (Temporal)   Resp 23   Ht 5' 7\" (1.702 m)   Wt 200 lb 12.8 oz (91.1 kg)   SpO2 93%   BMI 31.45 kg/m²     Intake/Output:    Intake/Output Summary (Last 24 hours) at 2025 0819  Last data filed at 2025 0745  Gross per 24 hour   Intake 1180 ml   Output 2800 ml   Net -1620 ml       Labs:  Lab Results   Component Value Date    WBC 10.0 2025    RBC 3.08 2025    HGB 9.1 2025    HCT 26.6 2025    MCV 86.4 2025    MCH 29.5 2025    MCHC 34.2 2025    RDW 13.2 2025    .0 2025     Lab Results   Component Value Date     2025    K 4.2 2025     2025    CO2 30.0 2025    BUN 23 2025    CREATSERUM 1.33 2025     2025    CA 9.2 2025     Lab Results   Component Value Date    INR <0.69 (L) 2025    INR 1.53 (H) 2025    INR 0.99 2025       Physical Exam:  General: VSS, A&Ox3, In NAD  Neck: No JVD.  Lungs: diminished at bases   Heart: S1,S2 RRR. Sternum Stable   Abdomen: Soft, non-tender, +bs  Extremities: Warm, dry, +BUE edema  Skin: sternotomy incision C/D/I  Neuro: no focal deficits     Assessment/Plan:   S/P AVR with a 25mm Inspiris bovine pericardial valve, ascending aorta replacement with a 28mm Hemashield graft and closure of PFO POD#3  -HD stable, off support  -?PPS, s/p 1g IV solumedrol   -Leukocytosis, afebrile, likely reactive, resolved- monitor   -Acute post op blood loss anemia, expected, stable- monitor   -Vol OL,  weight up from baseline- continue IV lasix   -Bump in Cr, good UOP- monitor   -Bowel regimen   -Pain management, prn norco and tylenol   -Chest tubes removed   -Discontinue PW  -GI PPX: protonix  -DVT PPX: TEDs/SCDs  -Encourage IS/ambulation   -PT/OT/Cardiac rehab   -Ok to transfer to CTU     -D/W Dr. Leena Reece PA-C   Cardiothoracic Surgery   6/27/2025  8:19 AM

## 2025-06-27 NOTE — CARDIAC REHAB
Cardiac rehab education completed with patient and spouse.  Pt has appt for Cardiac rehab on 08/14/25 at 11am.

## 2025-06-27 NOTE — PHYSICAL THERAPY NOTE
PHYSICAL THERAPY TREATMENT NOTE - INPATIENT    Room Number: 8620/8620-A     Session: 1     Number of Visits to Meet Established Goals: 3    Presenting Problem: s/p AVR 6/24/25  Co-Morbidities : hyperlipidemia    HOME SITUATION  Type of Home: House  Home Layout: Two level                     Lives With: Spouse    Drives: Yes           Prior Level of [place]: Pt lives with spouse in 2 story home. Pt independent with ADL and mobility. Pt does not use RW or cane at baseline. Pt works in insurance.     PHYSICAL THERAPY ASSESSMENT     Patient is currently functioning near baseline with bed mobility, transfers, and gait.    Patient currently does not meet criteria for skilled inpatient physical therapy services, however patient will continue to benefit from QID ambulation with nursing staff.  Pt is discharged from IP PT services.  RN aware to re-order if there is a decline in mobility status.      Patient continues to benefit from continued skilled PT services: with no additional skilled services but increased support at home.    PLAN DURING HOSPITALIZATION  Nursing Mobility Recommendation : 1 Assist  PT Device Recommendation: Rolling walker  PT Treatment Plan: Bed mobility, Endurance, Energy conservation, Patient education, Gait training, Balance training, Transfer training, Strengthening  Frequency (Obs): 3-5x/week     CURRENT GOALS     Goal #1 Patient is able to demonstrate supine - sit EOB @ level: supervision      Goal #2 Patient is able to demonstrate transfers EOB to/from BSC at assistance level: supervision      Goal #3 Patient is able to ambulate 150 feet with assist device: none at assistance level: supervision      Goal #4 Pt will ascend/descend 1 flight of stairs with supervision   Goal #5 Pt will demonstrate good understanding of sternal precautions/CV there-ex   Goal #6     Goal Comments: Goals established on 6/25/2025 6/27/2025 all goals achieved     SUBJECTIVE  \"Yeah lets do the  stairs!\"    OBJECTIVE  Precautions: Sternal    WEIGHT BEARING RESTRICTION     PAIN ASSESSMENT   Ratin          BALANCE                                                                                                                       Static Sitting: Good  Dynamic Sitting: Good           Static Standing: Fair -  Dynamic Standing: Poor +    ACTIVITY TOLERANCE                         O2 WALK       AM-PAC '6-Clicks' INPATIENT SHORT FORM - BASIC MOBILITY  How much difficulty does the patient currently have...  Patient Difficulty: Turning over in bed (including adjusting bedclothes, sheets and blankets)?: None   Patient Difficulty: Sitting down on and standing up from a chair with arms (e.g., wheelchair, bedside commode, etc.): None   Patient Difficulty: Moving from lying on back to sitting on the side of the bed?: None   How much help from another person does the patient currently need...   Help from Another: Moving to and from a bed to a chair (including a wheelchair)?: None   Help from Another: Need to walk in hospital room?: None   Help from Another: Climbing 3-5 steps with a railing?: None     AM-PAC Score:  Raw Score: 24   Approx Degree of Impairment: 0%   Standardized Score (AM-PAC Scale): 61.14   CMS Modifier (G-Code): CH    FUNCTIONAL ABILITY STATUS  Gait Assessment   Functional Mobility/Gait Assessment  Gait Assistance: Independent  Distance (ft): 200  Assistive Device: None  Pattern: Within Functional Limits  Stairs: Stairs  How Many Stairs: 12  Device: 1 Rail  Assist: Modified independent  Pattern: Ascend and Descend  Ascend and Descend : Step to    Skilled Therapy Provided    Bed Mobility:  Rolling: Mod I   Supine<>Sit: Mod I   Sit<>Supine: NT     Transfer Mobility:  Sit<>Stand: up at heather   Stand<>Sit:    Gait: see above    Therapist's Comments: stairs completed 2025        Exercises Repetitions   UPPER EXTREMITY     Head turns 10   Hand pumps 10   Shoulder shrugs 10   Bicep Curls - Alternating 10    Shoulder Flexion > 90 degrees 10         LOWER EXTREMITY     Ankle pumps 10   Ankle circles 10   Heel Raises - bilateral 10   Partial Arc Quad - alternating 10   Seated Marching 10         CHEST EXERCISES     Trunk Rotation 10   Elbow Circles 10   Chest Fly's  10   Scapular Retraction 10         Patient End of Session: Up in chair, Needs met, Call light within reach, RN aware of session/findings, All patient questions and concerns addressed, Hospital anti-slip socks    PT Session Time: 23 minutes  Gait Training: 15 minutes  Therapeutic Activity: 0 minutes  Therapeutic Exercise: 8 minutes   Neuromuscular Re-education: 0 minutes

## 2025-06-27 NOTE — PROGRESS NOTES
NURSING ADMISSION NOTE      Patient transferred from CNICU via Wheelchair  Oriented to room.  Safety precautions initiated.  Bed in low position.  Call light in reach.

## 2025-06-27 NOTE — PROGRESS NOTES
[facility]  Progress Note    Cain Davis Patient Status:  Inpatient    1965 MRN [id number]   Location [facility] 6NE-A Attending Jonathon Stern MD   Hosp Day # 3 PCP ZACH CHAVARRIA       Assessment:    AVR / PFO closure POD 3  Anemia  Volume overload  Hyperlipidemia    Plan:     Cont IV diuresis  Increase activity  Cont statin    Subjective:  No chest pain or shortness of breath.    Objective:  /76 (BP Location: Left arm)   Pulse 68   Temp 98 °F (36.7 °C) (Temporal)   Resp 18   Ht 5' 7\" (1.702 m)   Wt 200 lb 12.8 oz (91.1 kg)   SpO2 93%   BMI 31.45 kg/m²     Temp (24hrs), Av.8 °F (36.6 °C), Min:97 °F (36.1 °C), Max:98.4 °F (36.9 °C)      Tele  SR    Intake/Output:    Intake/Output Summary (Last 24 hours) at 2025 0649  Last data filed at 2025 0359  Gross per 24 hour   Intake 1120 ml   Output 2550 ml   Net -1430 ml       Last 3 Weights   25 0353 200 lb 12.8 oz (91.1 kg)   25 0600 201 lb 9.6 oz (91.4 kg)   25 0601 199 lb 12.8 oz (90.6 kg)   25 0557 189 lb (85.7 kg)   06/10/25 1438 190 lb (86.2 kg)   25 1139 180 lb (81.6 kg)   19 1348 185 lb (83.9 kg)               Physical Exam:    Gen: alert, oriented x 3, NAD  Heent/neck: no jvd  Cardiac: regular rate and rhythm, normal S1,S2  Lungs: CTA  Abd: soft, NT/ND +bs  Ext: trace edema      Labs:  Lab Results   Component Value Date    WBC 10.0 2025    HGB 9.1 2025    HCT 26.6 2025    .0 2025    CREATSERUM 1.33 2025    BUN 23 2025     2025    K 4.2 2025     2025    CO2 30.0 2025     2025    CA 9.2 2025       Medications:    Scheduled Medications[1]  Medication Infusions[2]      Joshua Rodriguez MD  2025  6:49 AM         [1]    ketorolac  30 mg Intravenous Q6H    acetaminophen  1,000 mg Intravenous Q6H    simethicone  80 mg Oral TID    Or    simethicone  160 mg Oral TID     lidocaine-menthol  1 patch Transdermal Daily    furosemide  40 mg Intravenous BID (Diuretic)    insulin aspart  1-10 Units Subcutaneous TID AC and HS    sertraline  50 mg Oral Daily    tamsulosin  0.4 mg Oral Daily    sennosides  8.6 mg Oral BID    docusate sodium  100 mg Oral BID    metoprolol tartrate  12.5 mg Oral 2x Daily(Beta Blocker)    mupirocin  1 Application Nasal BID    aspirin  81 mg Oral Daily    pantoprazole  40 mg Intravenous QAM AC    Or    pantoprazole  40 mg Oral QAM AC    rosuvastatin  10 mg Oral Nightly   [2]

## 2025-06-27 NOTE — PROGRESS NOTES
[facility]   part of [facility]     Hospitalist Progress Note     Cain Davis Patient Status:  Inpatient    1965 MRN [id number]   [facility] 6NE-A Attending Jonathon Stern MD   Hosp Day # 3 PCP ZACH CHAVARRIA     Chief Complaint: I had valve surgery    Subjective:     Patient seen with family at the bedside.  Patient grateful for the lidocaine patch as it helped his neck pain.    Objective:    Review of Systems:   A comprehensive review of systems was completed; pertinent positive and negatives stated in subjective.    Vital signs:  Temp:  [97 °F (36.1 °C)-99 °F (37.2 °C)] 97.7 °F (36.5 °C)  Pulse:  [64-75] 73  Resp:  [12-30] 20  BP: ()/(57-82) 132/81  SpO2:  [88 %-98 %] 96 %    Physical Exam:    General: No acute distress  Respiratory: No wheezes, no rhonchi  Cardiovascular: S1, S2, regular rate and rhythm  Abdomen: Soft, Non-tender, non-distended, positive bowel sounds  Neuro: No new focal deficits.   Extremities: No edema      Diagnostic Data:    Labs:  Recent Labs   Lab 25  1102 25  1342 25  0316 25  0340 25  0425   WBC  --  4.5 7.9 11.5* 10.0   HGB  --  10.6* 9.6* 9.4* 9.1*   MCV  --  85.3 85.9 88.9 86.4   .0* 167.0 165.0 157.0 148.0*   INR 1.53* <0.69*  --   --   --        Recent Labs   Lab 25  0316 25  0340 25  0425   * 176* 175*   BUN 12 11 23   CREATSERUM 1.08 1.17 1.33*   CA 8.6* 8.8 9.2    139 139   K 3.7  3.7 4.0  4.0 4.2    102 102   CO2 25.0 28.0 30.0       Estimated Glomerular Filtration Rate: 62 mL/min/1.73m2 (result from lab).    No results for input(s): \"TROP\", \"TROPHS\", \"CK\" in the last 168 hours.    Recent Labs   Lab 25  1102 25  1342   PTP 18.5* <10.0*   INR 1.53* <0.69*                  Microbiology    No results found for this visit on 25.      Imaging: Reviewed in Epic.    Medications: Scheduled Medications[1]    Assessment & Plan:      #Dilated  ascending thoracic aorta  #Bicuspid aortic valve  #Aortic stenosis  CVS primary  S/p AVR 25mm Inspiris bioprosthesis, replacement asc aorta, pfo clsure on 6/24  A1c 6.0 as of June 2025  ISS  S/p extubation on 6/24 @ 10p, now on room air, encouraged IS  S/p Solu-Medrol  IV Lasix    #MSK back pain, improving  Lidocaine patch, continue     #Hyperlipidemia     #Essential hypertension     #Anxiety     #Benign nonnodular prostatic hyperplasia         Yimi Hayward,     Supplementary Documentation:     Quality:  DVT Mechanical Prophylaxis: ROHINI hose, SCDs,    DVT Pharmacologic Prophylaxis   Medication   None         DVT Pharmacologic prophylaxis: Aspirin 81 mg      Code Status: Not on file  Olsen: No urinary catheter in place  Olsen Duration (in days): 2  Central line:    MAGALIE:     Discharge is dependent on: post op recovery   At this point Mr. Davis is expected to be discharge to: suspect home    The 21st Century Cures Act makes medical notes like these available to patients in the interest of transparency. Please be advised this is a medical document. Medical documents are intended to carry relevant information, facts as evident, and the clinical opinion of the practitioner. The medical note is intended as peer to peer communication and may appear blunt or direct. It is written in medical language and may contain abbreviations or verbiage that are unfamiliar.                         [1]    ketorolac  30 mg Intravenous Q6H    simethicone  80 mg Oral TID    Or    simethicone  160 mg Oral TID    lidocaine-menthol  1 patch Transdermal Daily    furosemide  40 mg Intravenous BID (Diuretic)    insulin aspart  1-10 Units Subcutaneous TID AC and HS    sertraline  50 mg Oral Daily    tamsulosin  0.4 mg Oral Daily    sennosides  8.6 mg Oral BID    docusate sodium  100 mg Oral BID    metoprolol tartrate  12.5 mg Oral 2x Daily(Beta Blocker)    mupirocin  1 Application Nasal BID    aspirin  81 mg Oral Daily    pantoprazole  40 mg  Intravenous QAM AC    Or    pantoprazole  40 mg Oral QAM AC    rosuvastatin  10 mg Oral Nightly

## 2025-06-28 VITALS
SYSTOLIC BLOOD PRESSURE: 123 MMHG | HEIGHT: 67 IN | DIASTOLIC BLOOD PRESSURE: 61 MMHG | HEART RATE: 70 BPM | WEIGHT: 198 LBS | RESPIRATION RATE: 18 BRPM | BODY MASS INDEX: 31.08 KG/M2 | TEMPERATURE: 99 F | OXYGEN SATURATION: 98 %

## 2025-06-28 LAB
ANION GAP SERPL CALC-SCNC: 9 MMOL/L (ref 0–18)
BUN BLD-MCNC: 31 MG/DL (ref 9–23)
CALCIUM BLD-MCNC: 8.8 MG/DL (ref 8.7–10.6)
CHLORIDE SERPL-SCNC: 103 MMOL/L (ref 98–112)
CO2 SERPL-SCNC: 29 MMOL/L (ref 21–32)
CREAT BLD-MCNC: 1.16 MG/DL (ref 0.7–1.3)
EGFRCR SERPLBLD CKD-EPI 2021: 73 ML/MIN/1.73M2 (ref 60–?)
ERYTHROCYTE [DISTWIDTH] IN BLOOD BY AUTOMATED COUNT: 13.3 %
GLUCOSE BLD-MCNC: 125 MG/DL (ref 70–99)
GLUCOSE BLD-MCNC: 133 MG/DL (ref 70–99)
GLUCOSE BLD-MCNC: 134 MG/DL (ref 70–99)
HCT VFR BLD AUTO: 24.7 % (ref 39–53)
HGB BLD-MCNC: 8.4 G/DL (ref 13–17.5)
MCH RBC QN AUTO: 29.5 PG (ref 26–34)
MCHC RBC AUTO-ENTMCNC: 34 G/DL (ref 31–37)
MCV RBC AUTO: 86.7 FL (ref 80–100)
OSMOLALITY SERPL CALC.SUM OF ELEC: 301 MOSM/KG (ref 275–295)
PLATELET # BLD AUTO: 201 10(3)UL (ref 150–450)
POTASSIUM SERPL-SCNC: 3.4 MMOL/L (ref 3.5–5.1)
RBC # BLD AUTO: 2.85 X10(6)UL (ref 4.3–5.7)
SODIUM SERPL-SCNC: 141 MMOL/L (ref 136–145)
WBC # BLD AUTO: 8.6 X10(3) UL (ref 4–11)

## 2025-06-28 PROCEDURE — 99232 SBSQ HOSP IP/OBS MODERATE 35: CPT | Performed by: HOSPITALIST

## 2025-06-28 RX ORDER — SIMETHICONE 80 MG
80 TABLET,CHEWABLE ORAL ONCE
Status: COMPLETED | OUTPATIENT
Start: 2025-06-28 | End: 2025-06-28

## 2025-06-28 RX ORDER — METOPROLOL TARTRATE 25 MG/1
12.5 TABLET, FILM COATED ORAL
Qty: 30 TABLET | Refills: 3 | Status: SHIPPED | OUTPATIENT
Start: 2025-06-28

## 2025-06-28 RX ORDER — POTASSIUM CHLORIDE 1500 MG/1
40 TABLET, EXTENDED RELEASE ORAL EVERY 4 HOURS
Status: COMPLETED | OUTPATIENT
Start: 2025-06-28 | End: 2025-06-28

## 2025-06-28 RX ORDER — ASPIRIN 81 MG/1
81 TABLET ORAL DAILY
Qty: 30 TABLET | Refills: 3 | Status: SHIPPED | OUTPATIENT
Start: 2025-06-29

## 2025-06-28 NOTE — PLAN OF CARE
POD #3 - AVR, replacement asc aorta, & PFO closure    Patient alert and oriented: x4, wears glasses  Activity level: UAL  Oxygen: Room air  Lungs: Diminished  Rhythm: NSR with BBB  /GI: Voids in urinal/BM today  Skin: Midsternal incision CLAUDIA with steri strips, Betadine applied  Pain: c/o midsternal pain, Tylenol given; gas pain, simethicone given  Walk: x7, stairs done  IS: 2000    Patient resting in bed with call light within reach, bed in low position, and side rails up x2 for safety. Patient verbalized understanding of plan of care. Will continue to monitor and reassess per protocol.    POC:  - IV lasix BID  - Possible discharge 6/28    Problem: CARDIOVASCULAR - ADULT  Goal: Maintains optimal cardiac output and hemodynamic stability  Description: INTERVENTIONS:  - Monitor vital signs, rhythm, and trends  - Monitor for bleeding, hypotension and signs of decreased cardiac output  - Evaluate effectiveness of vasoactive medications to optimize hemodynamic stability  - Monitor arterial and/or venous puncture sites for bleeding and/or hematoma  - Assess quality of pulses, skin color and temperature  - Assess for signs of decreased coronary artery perfusion - ex. Angina  - Evaluate fluid balance, assess for edema, trend weights  Outcome: Progressing     Problem: RESPIRATORY - ADULT  Goal: Achieves optimal ventilation and oxygenation  Description: INTERVENTIONS:  - Assess for changes in respiratory status  - Assess for changes in mentation and behavior  - Position to facilitate oxygenation and minimize respiratory effort  - Oxygen supplementation based on oxygen saturation or ABGs  - Provide Smoking Cessation handout, if applicable  - Encourage broncho-pulmonary hygiene including cough, deep breathe, Incentive Spirometry  - Assess the need for suctioning and perform as needed  - Assess and instruct to report SOB or any respiratory difficulty  - Respiratory Therapy support as indicated  - Manage/alleviate anxiety  -  Monitor for signs/symptoms of CO2 retention  Outcome: Progressing     Problem: SKIN/TISSUE INTEGRITY - ADULT  Goal: Incision(s), wounds(s) or drain site(s) healing without S/S of infection  Description: INTERVENTIONS:  - Assess and document risk factors for pressure ulcer development  - Assess and document skin integrity  - Assess and document dressing/incision, wound bed, drain sites and surrounding tissue  - Implement wound care per orders  - Initiate isolation precautions as appropriate  - Initiate Pressure Ulcer prevention bundle as indicated  Outcome: Progressing     Problem: Diabetes/Glucose Control  Goal: Glucose maintained within prescribed range  Description: INTERVENTIONS:  - Monitor Blood Glucose as ordered  - Assess for signs and symptoms of hyperglycemia and hypoglycemia  - Administer ordered medications to maintain glucose within target range  - Assess barriers to adequate nutritional intake and initiate nutrition consult as needed  - Instruct patient on self management of diabetes  Outcome: Progressing

## 2025-06-28 NOTE — PLAN OF CARE
Pt denies c/o pain, malaise, or cardiac symptoms. A&Ox4. Lungs diminished bilaterally with equal expansion, on room air. Pt NSR with bundle branch on monitor. Abdomen soft and non-tender with active bowel sounds in all four quadrants. Continent of B&B. Pt c/o bloating and gas, see MAR. Mid-sternal incision, steri-strips, betadine, no complications. Pt updated with plan of care.    Problem: Patient/Family Goals  Goal: Patient/Family Long Term Goal  Description: Patient's Long Term Goal: go home    Interventions:  - sternal precautions  - meds  - labs  - mds to see  - See additional Care Plan goals for specific interventions  Outcome: Progressing  Goal: Patient/Family Short Term Goal  Description: Patient's Short Term Goal: decrease gas    Interventions:   - meds  - frequent walking  - See additional Care Plan goals for specific interventions  Outcome: Progressing     Problem: CARDIOVASCULAR - ADULT  Goal: Maintains optimal cardiac output and hemodynamic stability  Description: INTERVENTIONS:  - Monitor vital signs, rhythm, and trends  - Monitor for bleeding, hypotension and signs of decreased cardiac output  - Evaluate effectiveness of vasoactive medications to optimize hemodynamic stability  - Monitor arterial and/or venous puncture sites for bleeding and/or hematoma  - Assess quality of pulses, skin color and temperature  - Assess for signs of decreased coronary artery perfusion - ex. Angina  - Evaluate fluid balance, assess for edema, trend weights  Outcome: Progressing     Problem: RESPIRATORY - ADULT  Goal: Achieves optimal ventilation and oxygenation  Description: INTERVENTIONS:  - Assess for changes in respiratory status  - Assess for changes in mentation and behavior  - Position to facilitate oxygenation and minimize respiratory effort  - Oxygen supplementation based on oxygen saturation or ABGs  - Provide Smoking Cessation handout, if applicable  - Encourage broncho-pulmonary hygiene including cough, deep  breathe, Incentive Spirometry  - Assess the need for suctioning and perform as needed  - Assess and instruct to report SOB or any respiratory difficulty  - Respiratory Therapy support as indicated  - Manage/alleviate anxiety  - Monitor for signs/symptoms of CO2 retention  Outcome: Progressing     Problem: SKIN/TISSUE INTEGRITY - ADULT  Goal: Incision(s), wounds(s) or drain site(s) healing without S/S of infection  Description: INTERVENTIONS:  - Assess and document risk factors for pressure ulcer development  - Assess and document skin integrity  - Assess and document dressing/incision, wound bed, drain sites and surrounding tissue  - Implement wound care per orders  - Initiate isolation precautions as appropriate  - Initiate Pressure Ulcer prevention bundle as indicated  Outcome: Progressing     Problem: Diabetes/Glucose Control  Goal: Glucose maintained within prescribed range  Description: INTERVENTIONS:  - Monitor Blood Glucose as ordered  - Assess for signs and symptoms of hyperglycemia and hypoglycemia  - Administer ordered medications to maintain glucose within target range  - Assess barriers to adequate nutritional intake and initiate nutrition consult as needed  - Instruct patient on self management of diabetes  Outcome: Progressing

## 2025-06-28 NOTE — PROGRESS NOTES
[facility]   [facility] Progress Note    Cain Davis Patient Status:  Inpatient    1965 MRN [id number]   Location [facility] 8NE-A Attending Jonathon Stern MD   Hosp Day # 4 PCP ZACH CHAVARRIA     Subjective: Up to chair, feeling good and ready to go home today.     Objective:  /61 (BP Location: Left arm)   Pulse 70   Temp 98.8 °F (37.1 °C) (Oral)   Resp 18   Ht 170.2 cm (5' 7\")   Wt 89.8 kg (197 lb 15.6 oz)   SpO2 98%   BMI 31.01 kg/m²          Intake/Output:    Intake/Output Summary (Last 24 hours) at 2025 1242  Last data filed at 2025 1228  Gross per 24 hour   Intake 240 ml   Output 3830 ml   Net -3590 ml           Last 3 Weights   25 0525 89.8 kg (197 lb 15.6 oz)   25 0353 91.1 kg (200 lb 12.8 oz)   25 0600 91.4 kg (201 lb 9.6 oz)   25 0601 90.6 kg (199 lb 12.8 oz)   25 0557 85.7 kg (189 lb)   06/10/25 1438 86.2 kg (190 lb)   25 1139 81.6 kg (180 lb)   19 1348 83.9 kg (185 lb)           Allergies:  Allergies[1]    Current Hospital Medications[2]    Labs:  Lab Results   Component Value Date    WBC 8.6 2025    HGB 8.4 2025    HCT 24.7 2025    .0 2025    CREATSERUM 1.16 2025    BUN 31 2025     2025    K 3.4 2025     2025    CO2 29.0 2025     2025    CA 8.8 2025           Physical Exam:    Neuro: Intact, A/O x 3  Lungs: CTA  Heart: RRR, S1, S2  Abdomen: Soft, non tender, +BS/BM  Extremities:Warm, dry, SHOOK, minimal generalized edema  Pulses: Palpable  Incisions: Sternal C/D/I       Assessment/Plan:  Problem List[3]    POD# 4 S/P AVR with a 25mm Inspiris bovine pericardial valve, ascending aorta replacement with a 28mm Hemashield graft and closure of PFO   -HD stable, off support  -?PPS, s/p 1g IV solumedrol   -Leukocytosis, afebrile, likely reactive, resolved- monitor   -Acute post op blood loss anemia, expected, stable-  monitor   -Vol OL, weight up from baseline- IV lasix   -Bump in Cr, improved 1/16 today, good UOP- monitor   -Bowel regimen   -Pain management, prn norco and tylenol   -Chest tubes removed   -PW removed  -Encourage IS/ambulation   -PT/OT/Cardiac rehab   -CTU monitoring  -Ok to discharge home today if ok with other services        D/W Dr. Leena ZAPATA RN  6/28/2025  12:42 PM         [1] No Known Allergies  [2]   Current Facility-Administered Medications   Medication Dose Route Frequency    potassium chloride (Klor-Con M20) tab 40 mEq  40 mEq Oral Q4H    acetaminophen (Tylenol Extra Strength) tab 500 mg  500 mg Oral Q6H PRN    Or    acetaminophen (Tylenol Extra Strength) tab 1,000 mg  1,000 mg Oral Q6H PRN    simethicone (Mylicon) chewable tab 80 mg  80 mg Oral TID    Or    simethicone (Mylicon) chewable tab 160 mg  160 mg Oral TID    HYDROmorphone (Dilaudid) 1 MG/ML injection 0.2 mg  0.2 mg Intravenous Q2H PRN    Or    HYDROmorphone (Dilaudid) 1 MG/ML injection 0.4 mg  0.4 mg Intravenous Q2H PRN    lidocaine-menthol 4-1 % patch 1 patch  1 patch Transdermal Daily    furosemide (Lasix) 10 mg/mL injection 40 mg  40 mg Intravenous BID (Diuretic)    insulin aspart (NovoLOG) 100 Units/mL FlexPen 1-10 Units  1-10 Units Subcutaneous TID AC and HS    HYDROcodone-acetaminophen (Norco) 5-325 MG per tab 1 tablet  1 tablet Oral Q4H PRN    Or    HYDROcodone-acetaminophen (Norco) 5-325 MG per tab 2 tablet  2 tablet Oral Q4H PRN    midazolam (Versed) 2 MG/2ML injection 1 mg  1 mg Intravenous Q30 Min PRN    glucose (Dex4) 15 GM/59ML oral liquid 15 g  15 g Oral Q15 Min PRN    Or    glucose (Glutose) 40% oral gel 15 g  15 g Oral Q15 Min PRN    Or    glucose-vitamin C (Dex-4) chewable tab 4 tablet  4 tablet Oral Q15 Min PRN    Or    dextrose 50% injection 50 mL  50 mL Intravenous Q15 Min PRN    Or    glucose (Dex4) 15 GM/59ML oral liquid 30 g  30 g Oral Q15 Min PRN    Or    glucose (Glutose) 40% oral gel 30 g  30 g Oral Q15 Min PRN     Or    glucose-vitamin C (Dex-4) chewable tab 8 tablet  8 tablet Oral Q15 Min PRN    sertraline (Zoloft) tab 50 mg  50 mg Oral Daily    tamsulosin (Flomax) cap 0.4 mg  0.4 mg Oral Daily    melatonin tab 3 mg  3 mg Oral Nightly PRN    sennosides (Senokot) tab 8.6 mg  8.6 mg Oral BID    docusate sodium (Colace) cap 100 mg  100 mg Oral BID    polyethylene glycol (PEG 3350) (Miralax) 17 g oral packet 17 g  17 g Oral Daily PRN    bisacodyl (Dulcolax) 10 MG rectal suppository 10 mg  10 mg Rectal Daily PRN    ondansetron (Zofran) 4 MG/2ML injection 4 mg  4 mg Intravenous Q6H PRN    metoprolol tartrate (Lopressor) partial tab 12.5 mg  12.5 mg Oral 2x Daily(Beta Blocker)    mupirocin (Bactroban) 2% nasal ointment 1 Application  1 Application Nasal BID    morphINE PF 2 MG/ML injection 2 mg  2 mg Intravenous Q2H PRN    Or    morphINE PF 4 MG/ML injection 4 mg  4 mg Intravenous Q2H PRN    aspirin DR tab 81 mg  81 mg Oral Daily    pantoprazole (Protonix) 40 mg in sodium chloride 0.9% PF 10 mL IV push  40 mg Intravenous QAM AC    Or    pantoprazole (Protonix) DR tab 40 mg  40 mg Oral QAM AC    rosuvastatin (Crestor) tab 10 mg  10 mg Oral Nightly   [3]   Patient Active Problem List  Diagnosis    Mixed hyperlipidemia    Lumbar spondylosis    Disc degeneration, lumbar    Other wrist sprain and strain    Lateral epicondylitis  of elbow    Lumbar radiculitis    Benign non-nodular prostatic hyperplasia    Onychomycosis    Elevated bilirubin    History of hypertension    Generalized anxiety disorder    Aortic stenosis

## 2025-06-28 NOTE — PROGRESS NOTES
[facility]   part of [facility]     Hospitalist Progress Note     Cain Davis Patient Status:  Inpatient    1965 MRN [id number]   [facility] 6NE-A Attending Jonathon Stern MD   Hosp Day # 4 PCP ZACH CHAVARRIA     Chief Complaint: I had valve surgery    Subjective:     Patient doing well.  No acute events overnight.    Objective:    Review of Systems:   A comprehensive review of systems was completed; pertinent positive and negatives stated in subjective.    Vital signs:  Temp:  [97.7 °F (36.5 °C)-98.8 °F (37.1 °C)] 98.5 °F (36.9 °C)  Pulse:  [62-75] 64  Resp:  [18-20] 18  BP: (112-132)/(64-81) 126/73  SpO2:  [92 %-98 %] 92 %    Physical Exam:    General: No acute distress  Respiratory: No wheezes, no rhonchi  Cardiovascular: S1, S2, regular rate and rhythm  Abdomen: Soft, Non-tender, non-distended, positive bowel sounds  Neuro: No new focal deficits.   Extremities: No edema      Diagnostic Data:    Labs:  Recent Labs   Lab 25  1102 25  1342 25  0316 25  0340 25  0425 25  0444   WBC  --  4.5 7.9 11.5* 10.0 8.6   HGB  --  10.6* 9.6* 9.4* 9.1* 8.4*   MCV  --  85.3 85.9 88.9 86.4 86.7   .0* 167.0 165.0 157.0 148.0* 201.0   INR 1.53* <0.69*  --   --   --   --        Recent Labs   Lab 25  0340 25  0425 25  0444   * 175* 134*   BUN 11 23 31*   CREATSERUM 1.17 1.33* 1.16   CA 8.8 9.2 8.8    139 141   K 4.0  4.0 4.2 3.4*    102 103   CO2 28.0 30.0 29.0       Estimated Glomerular Filtration Rate: 73 mL/min/1.73m2 (result from lab).    No results for input(s): \"TROP\", \"TROPHS\", \"CK\" in the last 168 hours.    Recent Labs   Lab 25  1102 25  1342   PTP 18.5* <10.0*   INR 1.53* <0.69*                  Microbiology    No results found for this visit on 25.      Imaging: Reviewed in Epic.    Medications: Scheduled Medications[1]    Assessment & Plan:      #Dilated ascending thoracic  aorta  #Bicuspid aortic valve  #Aortic stenosis  CVS primary  S/p AVR 25mm Inspiris bioprosthesis, replacement asc aorta, pfo clsure on 6/24  A1c 6.0 as of June 2025  ISS  S/p extubation on 6/24 @ 10p, now on room air, encouraged IS  S/p Solu-Medrol  S/p IV Lasix    #MSK back pain, improving  Lidocaine patch, continue     #Hyperlipidemia     #Essential hypertension     #Anxiety     #Benign nonnodular prostatic hyperplasia         Yimi Hayward,     Supplementary Documentation:     Quality:  DVT Mechanical Prophylaxis: ROHINI hose, SCDs,    DVT Pharmacologic Prophylaxis   Medication   None         DVT Pharmacologic prophylaxis: Aspirin 81 mg      Code Status: Not on file  Olsen: No urinary catheter in place  Olsen Duration (in days): 2  Central line:    MAGALIE: 6/28/2025    Discharge is dependent on: post op recovery   At this point Mr. Davis is expected to be discharge to: suspect home    The 21st Century Cures Act makes medical notes like these available to patients in the interest of transparency. Please be advised this is a medical document. Medical documents are intended to carry relevant information, facts as evident, and the clinical opinion of the practitioner. The medical note is intended as peer to peer communication and may appear blunt or direct. It is written in medical language and may contain abbreviations or verbiage that are unfamiliar.                         [1]    potassium chloride  40 mEq Oral Q4H    simethicone  80 mg Oral TID    Or    simethicone  160 mg Oral TID    lidocaine-menthol  1 patch Transdermal Daily    furosemide  40 mg Intravenous BID (Diuretic)    insulin aspart  1-10 Units Subcutaneous TID AC and HS    sertraline  50 mg Oral Daily    tamsulosin  0.4 mg Oral Daily    sennosides  8.6 mg Oral BID    docusate sodium  100 mg Oral BID    metoprolol tartrate  12.5 mg Oral 2x Daily(Beta Blocker)    mupirocin  1 Application Nasal BID    aspirin  81 mg Oral Daily    pantoprazole  40 mg  Intravenous QAM AC    Or    pantoprazole  40 mg Oral QAM AC    rosuvastatin  10 mg Oral Nightly

## 2025-06-28 NOTE — PROGRESS NOTES
Progress Note  Cain Davis Patient Status:  Inpatient    1965 MRN [id number]   Location [facility] 8NE-A Attending Jonathon Stern MD   Hosp Day # 4 PCP ZACH CHAVARRIA     Subjective:  In bed on exam. Feels well. Eager to go home. Pain controlled. Denies sob.     Objective:  /73 (BP Location: Left arm)   Pulse 64   Temp 98.5 °F (36.9 °C) (Oral)   Resp 18   Ht 5' 7\" (1.702 m)   Wt 197 lb 15.6 oz (89.8 kg)   SpO2 92%   BMI 31.01 kg/m²     Telemetry: nsr      Intake/Output:    Intake/Output Summary (Last 24 hours) at 2025 1213  Last data filed at 2025 1027  Gross per 24 hour   Intake 240 ml   Output 3230 ml   Net -2990 ml       Last 3 Weights   25 0525 197 lb 15.6 oz (89.8 kg)   25 0353 200 lb 12.8 oz (91.1 kg)   25 0600 201 lb 9.6 oz (91.4 kg)   25 0601 199 lb 12.8 oz (90.6 kg)   25 0557 189 lb (85.7 kg)   06/10/25 1438 190 lb (86.2 kg)   25 1139 180 lb (81.6 kg)   19 1348 185 lb (83.9 kg)       Labs:  Recent Labs   Lab 25  0340 25  0425 25  0444   * 175* 134*   BUN 11 23 31*   CREATSERUM 1.17 1.33* 1.16   EGFRCR 73 62 73   CA 8.8 9.2 8.8    139 141   K 4.0  4.0 4.2 3.4*    102 103   CO2 28.0 30.0 29.0     Recent Labs   Lab 25  0316 25  0340 25  0425 25  0444   RBC 3.27* 3.23* 3.08* 2.85*   HGB 9.6* 9.4* 9.1* 8.4*   HCT 28.1* 28.7* 26.6* 24.7*   MCV 85.9 88.9 86.4 86.7   MCH 29.4 29.1 29.5 29.5   MCHC 34.2 32.8 34.2 34.0   RDW 13.2 13.6 13.2 13.3   NEPRELIM 6.81  --   --   --    WBC 7.9 11.5* 10.0 8.6   .0 157.0 148.0* 201.0         No results for input(s): \"TROP\", \"TROPHS\", \"CK\" in the last 168 hours.    Review of Systems   Cardiovascular: Negative.    Respiratory: Negative.         Physical Exam:    Gen: alert, oriented x 3, NAD  Heent: pupils equal, reactive. Mucous membranes moist.   Neck: no jvd  Cardiac: regular rate and rhythm, normal S1,S2, soft  systolic murmur  Lungs: CTA  Abd: soft, NT/ND +bs  Ext: no edema  Skin: Warm, dry. Sternum cdi, steri strips.  Neuro: No focal deficits        Medications:    Scheduled Medications[1]  Medication Infusions[2]        Assessment:  Pod 4 s/p 25mm bioprosthetic AVR, asc ao replacement with 28mm hemashield graft, pfo closure 6/24/25  Preserved LVEF  ?PPS, received steroids.   Leukocytosis, low grade temps-resolved  Expected anemia--hgb stable  Vol OL-diuresed  HL-statin    Plan:  Has diuresed well and vol status stable. Renal function normal.   Asa, bb, statin  No objection to dc from cardiology standpoint if ok with CV. Has f/u scheduled with Dr. Henley on 7/9.     Plan of care discussed with patient, RN.    Charissa Szymanski, APRN  6/28/2025  12:13 PM           [1]    potassium chloride  40 mEq Oral Q4H    simethicone  80 mg Oral TID    Or    simethicone  160 mg Oral TID    lidocaine-menthol  1 patch Transdermal Daily    furosemide  40 mg Intravenous BID (Diuretic)    insulin aspart  1-10 Units Subcutaneous TID AC and HS    sertraline  50 mg Oral Daily    tamsulosin  0.4 mg Oral Daily    sennosides  8.6 mg Oral BID    docusate sodium  100 mg Oral BID    metoprolol tartrate  12.5 mg Oral 2x Daily(Beta Blocker)    mupirocin  1 Application Nasal BID    aspirin  81 mg Oral Daily    pantoprazole  40 mg Intravenous QAM AC    Or    pantoprazole  40 mg Oral QAM AC    rosuvastatin  10 mg Oral Nightly   [2]

## 2025-06-30 NOTE — PAYOR COMM NOTE
DISCHARGE REVIEW    Payor: [facility] PPO  Subscriber #:  [id number]  Authorization Number: [id number]    Admit date: 6/24/25  Admit time:   5:31 AM  Discharge Date: 6/28/2025  1:47 PM     Admitting Physician: Jonathon Stern MD  Attending Physician:  No att. providers found  Primary Care Physician: Nidhi Willis       Discharge Summary Notes    No notes of this type exist for this encounter.         REVIEWER COMMENTS

## 2025-06-30 NOTE — DISCHARGE SUMMARY
[facility]  Discharge Summary    Cain Davis Patient Status:  Inpatient    1965 MRN [id number]   Location [facility] 8NE-A Attending No att. providers found   Hosp Day # 4 PCP ZACH CHAVARRIA     Admit date: 2025    Discharge date and time: 2025  1:47 PM     Discharge Diagnoses:     1. Aortic stenosis.  2. Aortic insufficiency.  3. Ascending aortic aneurysm.  4. Patent foramen ovale.    Procedures Performed:     1. Aortic valve replacement, 25 mm Inspiris bovine pericardial valve.  2. Replacement of ascending aorta with 28 mm Hemashield graft.  3. Repair of patent foramen ovale.    HPI & Hospital Course: Cain Davis is a 59 year old male with aortic stenosis and insufficiency, ascending aortic aneurysm, and patent foramen ovale who was admitted to the hospital for aortic valve replacement, resection/replacement of his ascending aorta, and repair of patent foramen ovale. Surgery was performed and recovery was uneventful. For more detailed information please see the medical record.     Discharge Condition: Stable     Discharge Instructions:  Pt was instructed not to lift anything heavy and to follow up with Dr. Stern and Cardiology as directed.     Signed:  Jonathon Stern MD  2025

## 2025-07-09 ENCOUNTER — HOSPITAL ENCOUNTER (OUTPATIENT)
Dept: GENERAL RADIOLOGY | Age: 60
Discharge: HOME | End: 2025-07-09
Attending: THORACIC SURGERY (CARDIOTHORACIC VASCULAR SURGERY)
Payer: COMMERCIAL

## 2025-07-09 DIAGNOSIS — J90 PLEURAL EFFUSION: ICD-10-CM

## 2025-07-09 PROCEDURE — 71048 X-RAY EXAM CHEST 4+ VIEWS: CPT | Performed by: THORACIC SURGERY (CARDIOTHORACIC VASCULAR SURGERY)

## 2025-07-17 ENCOUNTER — HOSPITAL ENCOUNTER (OUTPATIENT)
Dept: CT IMAGING | Age: 60
Discharge: HOME | End: 2025-07-17
Attending: INTERNAL MEDICINE
Payer: COMMERCIAL

## 2025-07-17 DIAGNOSIS — Z95.2 S/P AORTIC VALVE REPLACEMENT: ICD-10-CM

## 2025-07-17 DIAGNOSIS — R42 DIZZINESS: ICD-10-CM

## 2025-07-17 PROCEDURE — 70450 CT HEAD/BRAIN W/O DYE: CPT | Performed by: INTERNAL MEDICINE

## 2025-08-14 ENCOUNTER — CARDPULM VISIT (OUTPATIENT)
Facility: HOSPITAL | Age: 60
End: 2025-08-14
Attending: INTERNAL MEDICINE

## 2025-08-14 VITALS — HEIGHT: 67 IN | BODY MASS INDEX: 28.72 KG/M2 | WEIGHT: 183 LBS

## 2025-08-14 RX ORDER — FAMOTIDINE 40 MG/1
40 TABLET, FILM COATED ORAL DAILY
COMMUNITY

## 2025-08-25 ENCOUNTER — CARDPULM VISIT (OUTPATIENT)
Facility: HOSPITAL | Age: 60
End: 2025-08-25
Attending: INTERNAL MEDICINE

## 2025-08-25 ENCOUNTER — ORDER TRANSCRIPTION (OUTPATIENT)
Facility: HOSPITAL | Age: 60
End: 2025-08-25

## 2025-08-25 DIAGNOSIS — Z95.2 S/P AVR: Primary | ICD-10-CM

## 2025-08-25 PROCEDURE — 93798 PHYS/QHP OP CAR RHAB W/ECG: CPT

## 2025-08-27 ENCOUNTER — CARDPULM VISIT (OUTPATIENT)
Facility: HOSPITAL | Age: 60
End: 2025-08-27
Attending: INTERNAL MEDICINE

## 2025-08-27 PROCEDURE — 93798 PHYS/QHP OP CAR RHAB W/ECG: CPT

## 2025-08-28 ENCOUNTER — CARDPULM VISIT (OUTPATIENT)
Facility: HOSPITAL | Age: 60
End: 2025-08-28
Attending: INTERNAL MEDICINE

## 2025-08-28 PROCEDURE — 93798 PHYS/QHP OP CAR RHAB W/ECG: CPT

## (undated) DEVICE — CELL SAVER RESERVOIR

## (undated) DEVICE — ZZ-CONVERTED-TO-522442- SPONGE 4X4 10PK

## (undated) DEVICE — CATHETER URETH AD 20FR L16IN YEL RUB RND TIP

## (undated) DEVICE — SUT PROL 3-0 36IN V-7 NABSRB BLU L26MM 1/2 CI

## (undated) DEVICE — Device: Brand: INTELLICART™

## (undated) DEVICE — SUT PROL 4-0 36IN SH NABSRB BLU 26MM 1/2 CIR

## (undated) DEVICE — SUT PROL 3-0 36IN SH NABSRB BLU 26MM 1/2 CIR

## (undated) DEVICE — SYRINGE MED 30ML STD CLR PLAS LL TIP N CTRL

## (undated) DEVICE — CELL SAVER TUBING

## (undated) DEVICE — ABSORBABLE HEMOSTAT (OXIDIZED REGENERATED CELLULOSE): Brand: SURGICEL

## (undated) DEVICE — SUT PERMA- 0 18IN FSL NABSRB BLK L30MM 3/8

## (undated) DEVICE — GAUZE,SPONGE,4"X4",16PLY,XRAY,STRL,LF: Brand: MEDLINE

## (undated) DEVICE — SUT ETHBND XL 2-0 30IN V-5 NABSRB GRN WHT L17

## (undated) DEVICE — SUT PROL 4-0 36IN V-5 NABSRB BLU L17MM 1/2 CI

## (undated) DEVICE — SUT POLYDEK 3-0 24IN NABSRB GRN

## (undated) DEVICE — OPEN HEART: Brand: MEDLINE INDUSTRIES, INC.

## (undated) DEVICE — HEMOSTAT KIT SURGIFLO THROM 8ML

## (undated) DEVICE — SUT PROL 5-0 36IN C-1 NABSRB BLU 13MM 3/8 CIR

## (undated) DEVICE — 3M™ TEGADERM™ TRANSPARENT FILM DRESSING FRAME STYLE, 1624W, 2-3/8 IN X 2-3/4 IN (6 CM X 7 CM), 100/CT 4CT/CASE: Brand: 3M™ TEGADERM™

## (undated) DEVICE — GLOVE SUR 7 BIOGEL PI ORTHOPRO PIP BRN PWD F

## (undated) DEVICE — SUT POLYDEK 2-0 75CM NABSRB GRN

## (undated) DEVICE — SOLUTION IRRIG 1000ML 0.9% NACL USP BTL

## (undated) DEVICE — LACTATED R 1000ML INJ

## (undated) DEVICE — SPONGE LAP 18X18IN WHT COT 4 PLY FLD STRUNG

## (undated) DEVICE — COR-KNOT MINI® COMBO KITBASE PACKAGE TYPE - KITEACH STERILE PACKAGE KIT CONTAINS (2) SINGLE PATIENT USE COR-KNOT MINI® DEVICES AND (12) COR-KNOT® QUICK LOADS®.: Brand: COR-KNOT MINI®

## (undated) DEVICE — 3M™ TEGADERM™ TRANSPARENT FILM DRESSING FRAME STYLE, 1626W, 4 IN X 4-3/4 IN (10 CM X 12 CM), 50/CT 4CT/CASE: Brand: 3M™ TEGADERM™

## (undated) DEVICE — SUT PROL 5-0 24IN NABSRB BLU 13MM C-1 3/8

## (undated) DEVICE — CABLE PT L10FT ELECTRD PIN DIA1-2MM ATRIUM

## (undated) DEVICE — SUT 6 DBL WIRE 14IN CCS-1 NABSRB L49MM 1/2 CI

## (undated) DEVICE — 3M™ BAIR HUGGER® CARDIAC ACCESS BLANKET, 5 PER CASE 63000: Brand: BAIR HUGGER™

## (undated) DEVICE — SUT PROL 4-0 1XL36IN V-5 NABSRB BLU L17MM TAP

## (undated) DEVICE — TOURNIQUET KIT 7 IN

## (undated) DEVICE — LEAD PACE 2.6FR L50CM UPLR TEMP MYOCARDIAL

## (undated) DEVICE — CABLE PT L10FT ELECTRD PIN DIA1-2MM VENTRICLE

## (undated) DEVICE — GOWN,SIRUS,FABRNF,XL,20/CS: Brand: MEDLINE

## (undated) DEVICE — TRANSFER PACK CONTAINER 600 ML WITH COUPLER. STERILE FLUID PATH: Brand: FENWAL TRANSFER PACK CONTAINER 600 ML WITH COUPLER

## (undated) DEVICE — INTENT TO BE USED WITH SUTURE MATERIAL FOR TISSUE CLOSURE: Brand: RICHARD-ALLAN® NEEDLE 1/2 CIRCLE TAPER

## (undated) DEVICE — CABLE SUR L12IN SM CLP LNG DISP

## (undated) DEVICE — COR-KNOT® QUICK LOAD® SINGLES: Brand: COR-KNOT® QUICK LOAD®

## (undated) DEVICE — STERILE POLYISOPRENE POWDER-FREE SURGICAL GLOVES: Brand: PROTEXIS

## (undated) DEVICE — NDL CNTR 40CT FM MAG: Brand: MEDLINE INDUSTRIES, INC.

## (undated) NOTE — MR AVS SNAPSHOT
[address] at [address], [address]  [address] [place] [address] Ave  [phone]               Thank you for choosing us for your health care visit with Vonda Dick MD.  We are glad to serve you and happy to pr [address]iza [address]  [address] [facility] [place], [place]    It is the patient's responsibility to check with and follow their insurance company's guidelines for prior authorization for this test.  You may be held resp Phone:  [phone]    - [employer] 8 % Soln            MyChart     Visit [facility]  You can access your MyChart to more actively manage your health care and view more details from this visit by going to [email or web address].   If you've recently h Don’t forget strength training with weights and resistance Set goals and track your progress   You don’t need to join a gym. Home exercises work great.  Put more priority on exercise in your life                    Visit [facility] online at

## (undated) NOTE — MR AVS SNAPSHOT
[address] at [address], [address]  [address] [place] [address]  [phone]               Thank you for choosing us for your health care visit with Alisa Wren MD.  We are glad to serve you and happy to pr [email or web address]

## (undated) NOTE — LETTER
Korey Humphreys M.D., F.A.C.S.  Jay Evans M.D., F.A.C.S.  Raffy Hutchison M.D.   Reynaldo Winkler M.D., F.A.C.S.  RHEA Farris M.D., F.A.C.S.   Ángel Summers M.D., F.A.C.S.  Saturnino Juarez M.D.    RHEA Castro M.D.   RHEA Schultz M.D., M.B.A.  Salinas Maddox M.D.  RHEA Simpson M.D., F.A.C.S.  Jonathon Stern M.D., F.A.C.S.   RHEA Aceves M.D., F.A.C.S., F.A.C.C. Varghese Asif M.D.  Dean Faulkner M.D.    BETTY Marks D.O., F.A.C.S.  Johnnie Mendoza M.D.   Michele Porter M.D.  Sean Jarvis M.D.    Willie Ocasio M.D., F.A.C.S.        Welcome to [facility]    As you contemplate possible surgical treatment, it is very important to us that you understand fully what is being discussed, that all of your questions have been answered, and that your options for treatment have been fully explained.    To that end, on the following page we will ask you some questions to make certain that you understand everything which has been explained to you. Included in this understanding is that there are both surgical and nonsurgical treatments available for you, that you have options regarding where your care is given, and what doctors are involved in your care. Included in these options would, of course, be the option to elect for no treatment whatsoever. We especially want to be sure that you have had a chance to have all of your questions and concerns answered. If there are any issues which have not been adequately addressed, we ask you to bring them forward so that we can thoroughly address them.    A patient who is fully informed and understands their condition and options for treatment, as well as potential adverse effects of treatment, is going to be a patient who receives the most benefit out of care rendered.  Our goal in addition to providing excellent surgical care is to provide the necessary information to you and your family in order to make decisions which are appropriate relative to your own care.    Please take the time necessary to read and answer the questions on the next page. Again, if you have any questions, bring them forward and we will certainly address them.    Sincerely,    [facility]    Date:___________________ _______________________ _______________________       Printed Patient Name  Signature of Patient    Date:___________________ _______________________ _______________________       Printed Witness Name  Signature of Witness    _______________________       Relationship of Witness to Patient        Consent Form Page: 1 of 2  [address] [facility] * [facility] [address] * [place] [address] * [phone] / [phone] *  Fax [phone] * Billing Fax [phone] Version: 9/9/2024    [facility]  Supplemental Consent Form    A [facility] ([facility] surgeon has met with me and explained the matter of my illness, and what treatments might be available to improve my condition. As a result of that conversation, I understand the following:    A [facility] surgeon met with me and explained, in detail, the nature of my condition for which surgery is being contemplated. The procedure being performed is:  AORTIC VALVE REPLACEMENT WITH ASCENDING AORTIC ANEURYSM REPLACEMENT, PATENT FORAMEN OVALE CLOSURE     Yes _____ No _____    A [facility] surgeon met with me and explained to me that there are alternatives to surgery which may include no surgery, medical therapy, or interventional treatment, among other options and the risks and benefits of the different treatment options:Yes _____ No _____    A [facility] surgeon has explained to me that if I should desire, he/she is willing to explain my case and the surgical and non-surgical options to family members:            Yes  _____ No _____    A [facility] surgeon has answered all of my questions regarding the topics we have discussed. I have been invited to ask more questions:  Yes _____ No _____    A [facility] surgeon has explained to me that if I seek other options or wish treatment at elsewhere, that his/her office will assist me in making such recommendations:  Yes _____ No _____    A [facility] surgeon has explained to me that death, risk of bleeding, stroke, multi-organ failure, heart attack and/or other complications are risks for the proposed surgical procedure:        Yes _____ No _____    A [facility] surgeon has explained to me that I have the right to cancel or postpone the surgery at any time prior to the start of surgery:    Yes _____ No _____    The nature and options for treatment for my condition has been explained to me, in detail, by a [facility] surgeon and all questions have been answered to my satisfaction. I understand that I am not required to undergo surgery, and further, that if I so desire, I could have surgery accomplished by another surgeon or at another institution. I understand and accept that which has been explained to me. I am able to make my decisions knowingly and willingly based on the data.    Date:___________________ _______________________ _______________________       Printed Patient Name  Signature of Patient    Date:___________________ _______________________ _______________________       Printed Witness Name  Signature of Witness    _______________________   Relationship of Witness to Patient          Consent Form Page: 3 of  2  [address] [facility] * [address] * [place] [address] * [phone] / [phone] *  Fax [phone] * Billing Fax [phone] Version: 9/9/2024

## (undated) NOTE — LETTER
Korey Humphreys M.D., F.A.C.S.  Jay Evans M.D., F.A.C.S.  Raffy Hutchison M.D.   Reynaldo Winkler M.D., F.A.C.S.  RHEA Farris M.D., F.A.C.S.   Ángel Summers M.D., F.A.C.S.  Saturnino Juarez M.D.    RHEA Castro M.D.   RHEA Schultz M.D., M.B.A.  Salinas Maddox M.D.  RHEA Simpson M.D., F.A.C.S.  Jonathon Stern M.D., F.A.C.S.   RHEA Aceves M.D., F.A.C.S., F.A.C.C. Varghese Asif M.D.  Dean Faulkner M.D.    BETTY Marks D.O., F.A.C.S.  Johnnie Mendoza M.D.   Michele Porter M.D.  Sean Jarvis M.D.    Willie Ocasio M.D., F.A.C.S.        Welcome to [facility]    As you contemplate possible surgical treatment, it is very important to us that you understand fully what is being discussed, that all of your questions have been answered, and that your options for treatment have been fully explained.    To that end, on the following page we will ask you some questions to make certain that you understand everything which has been explained to you. Included in this understanding is that there are both surgical and nonsurgical treatments available for you, that you have options regarding where your care is given, and what doctors are involved in your care. Included in these options would, of course, be the option to elect for no treatment whatsoever. We especially want to be sure that you have had a chance to have all of your questions and concerns answered. If there are any issues which have not been adequately addressed, we ask you to bring them forward so that we can thoroughly address them.    A patient who is fully informed and understands their condition and options for treatment, as well as potential adverse effects of treatment, is going to be a patient who receives the most benefit out of care rendered.  Our goal in addition to providing excellent surgical care is to provide the necessary information to you and your family in order to make decisions which are appropriate relative to your own care.    Please take the time necessary to read and answer the questions on the next page. Again, if you have any questions, bring them forward and we will certainly address them.    Sincerely,    [facility]    Date:___________________ _______________________ _______________________       Printed Patient Name  Signature of Patient    Date:___________________ _______________________ _______________________       Printed Witness Name  Signature of Witness    _______________________       Relationship of Witness to Patient        Consent Form Page: 1 of 2  [address] [facility] * [facility] [address] * [place] [address] * [phone] / [phone] *  Fax [phone] * Billing Fax [phone] Version: 9/9/2024    [facility]  Supplemental Consent Form    A [facility] surgeon has met with me and explained the matter of my illness, and what treatments might be available to improve my condition. As a result of that conversation, I understand the following:    A [facility] surgeon met with me and explained, in detail, the nature of my condition for which surgery is being contemplated. The procedure being performed is:  AORTIC VALVE REPLACEMENT WITH POSSIBLE  ASCENDING AORTIC ANEURYSM REPLACEMENT, PATENT FORAMEN OVALE CLOSURE     Yes _____ No _____    A [facility] surgeon met with me and explained to me that there are alternatives to surgery which may include no surgery, medical therapy, or interventional treatment, among other options and the risks and benefits of the different treatment options:Yes _____ No _____    A [facility] surgeon has explained to me that if I should desire, he/she is willing to explain my case and the surgical and non-surgical options to family members:             Yes _____ No _____    A [facility] surgeon has answered all of my questions regarding the topics we have discussed. I have been invited to ask more questions:  Yes _____ No _____    A [facility] surgeon has explained to me that if I seek other options or wish treatment at elsewhere, that his/her office will assist me in making such recommendations:  Yes _____ No _____    A [facility] surgeon has explained to me that death, risk of bleeding, stroke, multi-organ failure, heart attack and/or other complications are risks for the proposed surgical procedure:        Yes _____ No _____    A [facility] surgeon has explained to me that I have the right to cancel or postpone the surgery at any time prior to the start of surgery:    Yes _____ No _____    The nature and options for treatment for my condition has been explained to me, in detail, by a [facility] surgeon and all questions have been answered to my satisfaction. I understand that I am not required to undergo surgery, and further, that if I so desire, I could have surgery accomplished by another surgeon or at another institution. I understand and accept that which has been explained to me. I am able to make my decisions knowingly and willingly based on the data.    Date:___________________ _______________________ _______________________       Printed Patient Name  Signature of Patient    Date:___________________ _______________________ _______________________       Printed Witness Name  Signature of Witness    _______________________   Relationship of Witness to Patient          Consent Form Page: 3 of  2  [address] [facility] * [address] * [place] [address] * [phone] / [phone] *  Fax [phone] * Billing Fax [phone] Version: 9/9/2024

## (undated) NOTE — LETTER
Korey Humphreys M.D., F.A.C.S.  Jay Evans M.D., F.A.C.S.  Raffy Hutchison M.D.   Reynaldo Winkler M.D., F.A.C.S.  RHEA Farris M.D., F.A.C.S.   Ángel Summers M.D., F.A.C.S.  Saturnino Juarez M.D.    RHEA Castro M.D.   RHEA Schultz M.D., M.B.A.  Salinas Maddox M.D.  RHEA Simpson M.D., F.A.C.S.  Jonathon Stern M.D., F.A.C.S.   RHEA Aceves M.D., F.A.C.S., F.A.C.C. Varghese Asif M.D.  Dean Faulkner M.D.    BETTY Marks D.O., F.A.C.S.  Johnnie Mendoza M.D.   Michele Porter M.D.  Sean Jarvis M.D.    Willie Ocasio M.D., F.A.C.S.        Welcome to [facility]    As you contemplate possible surgical treatment, it is very important to us that you understand fully what is being discussed, that all of your questions have been answered, and that your options for treatment have been fully explained.    To that end, on the following page we will ask you some questions to make certain that you understand everything which has been explained to you. Included in this understanding is that there are both surgical and nonsurgical treatments available for you, that you have options regarding where your care is given, and what doctors are involved in your care. Included in these options would, of course, be the option to elect for no treatment whatsoever. We especially want to be sure that you have had a chance to have all of your questions and concerns answered. If there are any issues which have not been adequately addressed, we ask you to bring them forward so that we can thoroughly address them.    A patient who is fully informed and understands their condition and options for treatment, as well as potential adverse effects of treatment, is going to be a patient who receives the most benefit out of care rendered.  Our goal in addition to providing excellent surgical care is to provide the necessary information to you and your family in order to make decisions which are appropriate relative to your own care.    Please take the time necessary to read and answer the questions on the next page. Again, if you have any questions, bring them forward and we will certainly address them.    Sincerely,    [facility]    Date:___________________ _______________________ _______________________       Printed Patient Name  Signature of Patient    Date:___________________ _______________________ _______________________       Printed Witness Name  Signature of Witness    _______________________       Relationship of Witness to Patient        Consent Form Page: 1 of 2  [address] [facility] * [facility] [address] * [place] [address] * [phone] / [phone] *  Fax [phone] * Billing Fax [phone] Version: 9/9/2024    [facility]  Supplemental Consent Form    A [facility] surgeon has met with me and explained the matter of my illness, and what treatments might be available to improve my condition. As a result of that conversation, I understand the following:    A [facility] surgeon met with me and explained, in detail, the nature of my condition for which surgery is being contemplated. The procedure being performed is:  AORTIC VALVE REPLACEMENT WITH POSSIBLE ASCENDING AORTIC ANEURYSM REPLACEMENT, PATENT FORAMEN OVALE CLOSURE     Yes _____ No _____    A [facility] surgeon met with me and explained to me that there are alternatives to surgery which may include no surgery, medical therapy, or interventional treatment, among other options and the risks and benefits of the different treatment options:Yes _____ No _____    A [facility] surgeon has explained to me that if I should desire, he/she is willing to explain my case and the surgical and non-surgical options to family members:             Yes _____ No _____    A [facility] surgeon has answered all of my questions regarding the topics we have discussed. I have been invited to ask more questions:  Yes _____ No _____    A [facility] surgeon has explained to me that if I seek other options or wish treatment at elsewhere, that his/her office will assist me in making such recommendations:  Yes _____ No _____    A [facility] surgeon has explained to me that death, risk of bleeding, stroke, multi-organ failure, heart attack and/or other complications are risks for the proposed surgical procedure:        Yes _____ No _____    A [facility] surgeon has explained to me that I have the right to cancel or postpone the surgery at any time prior to the start of surgery:    Yes _____ No _____    The nature and options for treatment for my condition has been explained to me, in detail, by a [facility] surgeon and all questions have been answered to my satisfaction. I understand that I am not required to undergo surgery, and further, that if I so desire, I could have surgery accomplished by another surgeon or at another institution. I understand and accept that which has been explained to me. I am able to make my decisions knowingly and willingly based on the data.    Date:___________________ _______________________ _______________________       Printed Patient Name  Signature of Patient    Date:___________________ _______________________ _______________________       Printed Witness Name  Signature of Witness    _______________________   Relationship of Witness to Patient          Consent Form Page: 3 of  2  [address] [facility] * [address] * [place] [address] * [phone] / [phone] *  Fax [phone] * Billing Fax [phone] Version: 9/9/2024